# Patient Record
Sex: FEMALE | Race: BLACK OR AFRICAN AMERICAN | Employment: UNEMPLOYED | URBAN - METROPOLITAN AREA
[De-identification: names, ages, dates, MRNs, and addresses within clinical notes are randomized per-mention and may not be internally consistent; named-entity substitution may affect disease eponyms.]

---

## 2017-06-19 ENCOUNTER — OFFICE VISIT (OUTPATIENT)
Dept: INTERNAL MEDICINE CLINIC | Age: 67
End: 2017-06-19

## 2017-06-19 VITALS
WEIGHT: 278 LBS | HEIGHT: 61 IN | DIASTOLIC BLOOD PRESSURE: 126 MMHG | OXYGEN SATURATION: 94 % | TEMPERATURE: 97.8 F | BODY MASS INDEX: 52.49 KG/M2 | HEART RATE: 72 BPM | RESPIRATION RATE: 20 BRPM | SYSTOLIC BLOOD PRESSURE: 188 MMHG

## 2017-06-19 DIAGNOSIS — E55.9 VITAMIN D DEFICIENCY: ICD-10-CM

## 2017-06-19 DIAGNOSIS — Z13.6 SCREENING FOR ISCHEMIC HEART DISEASE: ICD-10-CM

## 2017-06-19 DIAGNOSIS — E11.9 CONTROLLED TYPE 2 DIABETES MELLITUS WITHOUT COMPLICATION, WITHOUT LONG-TERM CURRENT USE OF INSULIN (HCC): Primary | ICD-10-CM

## 2017-06-19 DIAGNOSIS — I10 ESSENTIAL HYPERTENSION: ICD-10-CM

## 2017-06-19 PROBLEM — E78.5 HYPERLIPIDEMIA: Status: ACTIVE | Noted: 2017-06-19

## 2017-06-19 RX ORDER — ERGOCALCIFEROL 1.25 MG/1
50000 CAPSULE ORAL
Qty: 12 CAP | Refills: 2 | Status: SHIPPED | OUTPATIENT
Start: 2017-06-19 | End: 2018-03-06 | Stop reason: SDUPTHER

## 2017-06-19 RX ORDER — METFORMIN HYDROCHLORIDE 500 MG/1
1000 TABLET, EXTENDED RELEASE ORAL DAILY
Qty: 60 TAB | Refills: 5 | Status: SHIPPED | OUTPATIENT
Start: 2017-06-19 | End: 2017-08-21 | Stop reason: SDUPTHER

## 2017-06-19 RX ORDER — HYDROCHLOROTHIAZIDE 50 MG/1
50 TABLET ORAL DAILY
Qty: 30 TAB | Refills: 5 | Status: SHIPPED | OUTPATIENT
Start: 2017-06-19 | End: 2017-08-17 | Stop reason: SDUPTHER

## 2017-06-19 RX ORDER — LOSARTAN POTASSIUM 100 MG/1
100 TABLET ORAL DAILY
Qty: 30 TAB | Refills: 5 | Status: SHIPPED | OUTPATIENT
Start: 2017-06-19 | End: 2017-08-17 | Stop reason: SDUPTHER

## 2017-06-19 RX ORDER — METOPROLOL SUCCINATE 50 MG/1
50 TABLET, EXTENDED RELEASE ORAL DAILY
Qty: 30 TAB | Refills: 5 | Status: SHIPPED | OUTPATIENT
Start: 2017-06-19 | End: 2017-08-17 | Stop reason: SDUPTHER

## 2017-06-19 NOTE — PROGRESS NOTES
Chief Complaint   Patient presents with    Establish Care    Diabetes    Hypertension   1. Have you been to the ER, urgent care clinic since your last visit? Hospitalized since your last visit? No    2. Have you seen or consulted any other health care providers outside of the [address] [place] [address] since your last visit? Include any pap smears or colon screening.  No

## 2017-06-19 NOTE — MR AVS SNAPSHOT
Visit Information Date & Time Provider Department Dept. Phone Encounter #  
 6/19/2017  1:30 PM Erica Oliveira MD [facility] [phone] Follow-up Instructions Return in about 1 month (around 7/19/2017) for HTN, DM f/u. Upcoming Health Maintenance Date Due Hepatitis C Screening 1950 DTaP/Tdap/Td series (1 - Tdap) 2/19/1972 BREAST CANCER SCRN MAMMOGRAM 2/19/2001 FOBT Q 1 YEAR AGE 50-75 2/19/2001 ZOSTER VACCINE AGE 60> 2/19/2011 GLAUCOMA SCREENING Q2Y 2/19/2016 OSTEOPOROSIS SCREENING (DEXA) 2/19/2016 Pneumococcal 65+ Low/Medium Risk (1 of 2 - PCV13) 2/19/2016 MEDICARE YEARLY EXAM 2/19/2016 INFLUENZA AGE 9 TO ADULT 8/1/2017 Allergies as of 6/19/2017  Review Complete On: 6/19/2017 By: Erica Oliveira MD  
  
 Severity Noted Reaction Type Reactions Penicillins Low 06/19/2017   Topical Itching Current Immunizations  Never Reviewed No immunizations on file. Not reviewed this visit You Were Diagnosed With   
  
 Codes Comments Controlled type 2 diabetes mellitus without complication, without long-term current use of insulin ([facility]    -  Primary ICD-10-CM: E11.9 ICD-9-CM: 250.00 Essential hypertension     ICD-10-CM: I10 
ICD-9-CM: 401.9 Hyperlipidemia, unspecified hyperlipidemia type     ICD-10-CM: E78.5 ICD-9-CM: 272.4 Vitamin D deficiency     ICD-10-CM: E55.9 ICD-9-CM: 268.9 Vitals BP Pulse Temp Resp Height(growth percentile) Weight(growth percentile) (!) 188/126 72 97.8 °F (36.6 °C) (Oral) 20 5' 1\" (1.549 m) 278 lb (126.1 kg) SpO2 BMI Smoking Status 94% 52.53 kg/m2 Never Smoker Vitals History BMI and BSA Data Body Mass Index Body Surface Area 52.53 kg/m 2 2.33 m 2 Preferred Pharmacy Pharmacy Name Phone [facility] [address], [address] Your Updated Medication List  
 This list is accurate as of: 6/19/17  2:35 PM.  Always use your most recent med list.  
  
  
  
  
 ergocalciferol 50,000 unit capsule Commonly known as:  ERGOCALCIFEROL Take 1 Cap by mouth every seven (7) days. hydroCHLOROthiazide 50 mg tablet Commonly known as:  HYDRODIURIL Take 1 Tab by mouth daily. losartan 100 mg tablet Commonly known as:  COZAAR Take 1 Tab by mouth daily. metFORMIN  mg tablet Commonly known as:  GLUCOPHAGE XR Take 2 Tabs by mouth daily. metoprolol succinate 50 mg XL tablet Commonly known as:  TOPROL-XL Take 1 Tab by mouth daily. Prescriptions Sent to Pharmacy Refills  
 metFORMIN ER (GLUCOPHAGE XR) 500 mg tablet 5 Sig: Take 2 Tabs by mouth daily. Class: Normal  
 Pharmacy: [facility] [address] [place] [address], [address] [place] [address] Ph #: [phone] Route: Oral  
 hydroCHLOROthiazide (HYDRODIURIL) 50 mg tablet 5 Sig: Take 1 Tab by mouth daily. Class: Normal  
 Pharmacy: [facility] [address] [place] [address], [address] [place] [address] Ph #: [phone] Route: Oral  
 losartan (COZAAR) 100 mg tablet 5 Sig: Take 1 Tab by mouth daily. Class: Normal  
 Pharmacy: [facility] [address] [place] [address], [address] [place] [address] Ph #: [phone] Route: Oral  
 metoprolol succinate (TOPROL-XL) 50 mg XL tablet 5 Sig: Take 1 Tab by mouth daily. Class: Normal  
 Pharmacy: [facility] [address] [place] [address], [address] [place] [address] Ph #: [phone] Route: Oral  
 ergocalciferol (ERGOCALCIFEROL) 50,000 unit capsule 2 Sig: Take 1 Cap by mouth every seven (7) days. Class: Normal  
 Pharmacy: [facility] [address] [place] [address], [address] [place] [address] Ph #: [phone] Route: Oral  
  
Follow-up Instructions Return in about 1 month (around 7/19/2017) for HTN, DM f/u. To-Do List   
 06/19/2017 Lab:  CBC WITH AUTOMATED DIFF   
  
 06/19/2017 Lab:  HEMOGLOBIN A1C WITH EAG   
  
 06/19/2017 Lab:  LIPID PANEL   
  
 06/19/2017 Lab:  METABOLIC PANEL, COMPREHENSIVE   
  
 06/19/2017 Lab:  TSH 3RD GENERATION   
  
 06/19/2017 Lab:  VITAMIN D, 25 HYDROXY Patient Instructions Learning About Diabetes Food Guidelines Your Care Instructions Meal planning is important to manage diabetes. It helps keep your blood sugar at a target level (which you set with your doctor). You don't have to eat special foods. You can eat what your family eats, including sweets once in a while. But you do have to pay attention to how often you eat and how much you eat of certain foods. You may want to work with a dietitian or a certified diabetes educator (CDE) to help you plan meals and snacks. A dietitian or CDE can also help you lose weight if that is one of your goals. What should you know about eating carbs? Managing the amount of carbohydrate (carbs) you eat is an important part of healthy meals when you have diabetes. Carbohydrate is found in many foods. · Learn which foods have carbs. And learn the amounts of carbs in different foods. ¨ Bread, cereal, pasta, and rice have about 15 grams of carbs in a serving. A serving is 1 slice of bread (1 ounce), ½ cup of cooked cereal, or 1/3 cup of cooked pasta or rice. ¨ Fruits have 15 grams of carbs in a serving. A serving is 1 small fresh fruit, such as an apple or orange; ½ of a banana; ½ cup of cooked or canned fruit; ½ cup of fruit juice; 1 cup of melon or raspberries; or 2 tablespoons of dried fruit. ¨ Milk and no-sugar-added yogurt have 15 grams of carbs in a serving. A serving is 1 cup of milk or 2/3 cup of no-sugar-added yogurt. ¨ Starchy vegetables have 15 grams of carbs in a serving. A serving is ½ cup of mashed potatoes or sweet potato; 1 cup winter squash; ½ of a small baked potato; ½ cup of cooked beans; or ½ cup cooked corn or green peas. · Learn how much carbs to eat each day and at each meal. A dietitian or CDE can teach you how to keep track of the amount of carbs you eat. This is called carbohydrate counting. · If you are not sure how to count carbohydrate grams, use the Plate Method to plan meals. It is a good, quick way to make sure that you have a balanced meal. It also helps you spread carbs throughout the day. ¨ Divide your plate by types of foods. Put non-starchy vegetables on half the plate, meat or other protein food on one-quarter of the plate, and a grain or starchy vegetable in the final quarter of the plate. To this you can add a small piece of fruit and 1 cup of milk or yogurt, depending on how many carbs you are supposed to eat at a meal. 
· Try to eat about the same amount of carbs at each meal. Do not \"save up\" your daily allowance of carbs to eat at one meal. 
· Proteins have very little or no carbs per serving. Examples of proteins are beef, chicken, turkey, fish, eggs, tofu, cheese, cottage cheese, and peanut butter. A serving size of meat is 3 ounces, which is about the size of a deck of cards. Examples of meat substitute serving sizes (equal to 1 ounce of meat) are 1/4 cup of cottage cheese, 1 egg, 1 tablespoon of peanut butter, and ½ cup of tofu. How can you eat out and still eat healthy? · Learn to estimate the serving sizes of foods that have carbohydrate. If you measure food at home, it will be easier to estimate the amount in a serving of restaurant food. · If the meal you order has too much carbohydrate (such as potatoes, corn, or baked beans), ask to have a low-carbohydrate food instead. Ask for a salad or green vegetables. · If you use insulin, check your blood sugar before and after eating out to help you plan how much to eat in the future. · If you eat more carbohydrate at a meal than you had planned, take a walk or do other exercise. This will help lower your blood sugar. What else should you know? · Limit saturated fat, such as the fat from meat and dairy products. This is a healthy choice because people who have diabetes are at higher risk of heart disease. So choose lean cuts of meat and nonfat or low-fat dairy products. Use olive or canola oil instead of butter or shortening when cooking. · Don't skip meals. Your blood sugar may drop too low if you skip meals and take insulin or certain medicines for diabetes. · Check with your doctor before you drink alcohol. Alcohol can cause your blood sugar to drop too low. Alcohol can also cause a bad reaction if you take certain diabetes medicines. Follow-up care is a key part of your treatment and safety. Be sure to make and go to all appointments, and call your doctor if you are having problems. It's also a good idea to know your test results and keep a list of the medicines you take. Where can you learn more? Go to http://natty-teja.info/. Enter I184 in the search box to learn more about \"Learning About Diabetes Food Guidelines. \" Current as of: May 23, 2016 Content Version: 11.2 © 2722-1505 [employer]. Care instructions adapted under license by [employer] (which disclaims liability or warranty for this information). If you have questions about a medical condition or this instruction, always ask your healthcare professional. [address] any warranty or liability for your use of this information. Introducing [facility]! Lori Kiser introduces [employer] patient portal. Now you can access parts of your medical record, email your doctor's office, and request medication refills online. 1. In your internet browser, go to [email or web address] 2. Click on the First Time User? Click Here link in the Sign In box. You will see the New Member Sign Up page. 3. Enter your [employer] Access Code exactly as it appears below.  You will not need to use this code after youve completed the sign-up process. If you do not sign up before the expiration date, you must request a new code. · [employer] Access Code: [email or web address] Expires: 9/17/2017  2:35 PM 
 
4. Enter the last four digits of your Social Security Number (xxxx) and Date of Birth (mm/dd/yyyy) as indicated and click Submit. You will be taken to the next sign-up page. 5. Create a [employer] ID. This will be your [employer] login ID and cannot be changed, so think of one that is secure and easy to remember. 6. Create a [employer] password. You can change your password at any time. 7. Enter your Password Reset Question and Answer. This can be used at a later time if you forget your password. 8. Enter your e-mail address. You will receive e-mail notification when new information is available in [address]. 9. Click Sign Up. You can now view and download portions of your medical record. 10. Click the Download Summary menu link to download a portable copy of your medical information. If you have questions, please visit the Frequently Asked Questions section of the [employer] website. Remember, [employer] is NOT to be used for urgent needs. For medical emergencies, dial 911. Now available from your iPhone and Android! Please provide this summary of care documentation to your next provider. If you have any questions after today's visit, please call [phone].

## 2017-06-19 NOTE — PROGRESS NOTES
History:   Ngoc Miller is a 78 y.o. female presenting today for an initial visit for Establish Care; Diabetes; and Hypertension  Her  sees me, so she's transferring to me as well. Was seeing a [facility] doctor up until then. 1.  HTN:  He BP has been variable. She's on HCTZ, metoprolol, and losartan and says she took them today. As far as she knows, her BP was fine at previous doctor's appointments. But she says the current meds are very old and so thinks they're not good anymore. She denies symptoms. The patient denies chest pain, shortness of breath, headaches, dizziness, weakness. She does get swelling in the legs. 2.  DM:  She's on metformin every day. Doesn't check sugars at home. No symptoms. She had breakfast today but no lunch yet. 3.  Vit D deficiency:  Has been on Vit D weekly in the past but not now. 4.  Anxiety:  She's been under a lot of stress with her 's illness. Not currently on meds for anxiety. Has been on meds once in the past.    Review of Systems   Constitutional: Negative. HENT: Negative. Eyes: Negative. Respiratory: Negative. Cardiovascular: Positive for leg swelling. Negative for chest pain. Gastrointestinal: Negative. Genitourinary: Negative. Skin: Negative. Neurological: Negative. Psychiatric/Behavioral: The patient is not nervous/anxious (stress currently). Past Medical History:   Diagnosis Date    Anxiety     Arthritis     Diabetes ([address]     Hypercholesterolemia     Hypertension        Past Surgical History:   Procedure Laterality Date    HX GYN         Social History     Social History    Marital status:      Spouse name: N/A    Number of children: N/A    Years of education: N/A     Occupational History    Not on file.      Social History Main Topics    Smoking status: Never Smoker    Smokeless tobacco: Not on file    Alcohol use No    Drug use: No    Sexual activity: Not Currently Partners: Male     Other Topics Concern    Not on file     Social History Narrative    No narrative on file       Family History   Problem Relation Age of Onset    Heart Disease Mother     Liver Disease Father     Heart Disease Brother        No current outpatient prescriptions on file prior to visit. No current facility-administered medications on file prior to visit. Allergies   Allergen Reactions    Penicillins Itching       Objective:   VS:    Visit Vitals    BP (!) 188/126    Pulse 72    Temp 97.8 °F (36.6 °C) (Oral)    Resp 20    Ht 5' 1\" (1.549 m)    Wt 278 lb (126.1 kg)    SpO2 94%    BMI 52.53 kg/m2     Physical Exam   Constitutional: She is oriented to person, place, and time. She appears well-developed and well-nourished. No distress. HENT:   Head: Normocephalic and atraumatic. Right Ear: External ear normal.   Left Ear: External ear normal.   Nose: Nose normal.   Mouth/Throat: Oropharynx is clear and moist.   Eyes: Conjunctivae and EOM are normal. Pupils are equal, round, and reactive to light. No scleral icterus. Neck: Normal range of motion. Neck supple. No tracheal deviation present. No thyromegaly present. Cardiovascular: Normal rate, regular rhythm, normal heart sounds and intact distal pulses. Exam reveals no gallop and no friction rub. No murmur heard. Pulmonary/Chest: Effort normal and breath sounds normal. She has no wheezes. She has no rales. Abdominal: Soft. Bowel sounds are normal. She exhibits no distension. There is no hepatosplenomegaly. There is no tenderness. Musculoskeletal: Normal range of motion. She exhibits no edema or tenderness. Lymphadenopathy:     She has no cervical adenopathy. Neurological: She is alert and oriented to person, place, and time. Skin: Skin is warm and dry. No rash noted. No pallor. Psychiatric: She has a normal mood and affect.  Her behavior is normal. Judgment and thought content normal.   Nursing note and vitals reviewed. Assessment/ Plan:     Ramo Echavarria was seen today for establish care, diabetes and hypertension. Diagnoses and all orders for this visit:    Controlled type 2 diabetes mellitus without complication, without long-term current use of insulin ([facility]):  She's been on 1 metformin regular 500 mg daily. Wants to be on daily meds, not BID. So switching to metformin XR and increasing to 1000 daily because I think she will probably need higher dose. -     METABOLIC PANEL, COMPREHENSIVE; Future  -     HEMOGLOBIN A1C WITH EAG; Future  -     metFORMIN ER (GLUCOPHAGE XR) 500 mg tablet; Take 2 Tabs by mouth daily.  -     UT COLLECTION VENOUS BLOOD,VENIPUNCTURE    Essential hypertension:  BP very high, but she thinks her meds are old and so maybe . So will reorder her 3 meds and see if BP improves. She will check BP in the next few days, and I'll see her back in 1 month to make sure ok. -     CBC WITH AUTOMATED DIFF; Future  -     TSH 3RD GENERATION; Future  -     hydroCHLOROthiazide (HYDRODIURIL) 50 mg tablet; Take 1 Tab by mouth daily. -     losartan (COZAAR) 100 mg tablet; Take 1 Tab by mouth daily. -     metoprolol succinate (TOPROL-XL) 50 mg XL tablet; Take 1 Tab by mouth daily.  -     UT COLLECTION VENOUS BLOOD,VENIPUNCTURE    Screening for ischemic heart disease:  No hx of hyperlipidemia, but with comorbidities will check lipids.  -     LIPID PANEL; Future    Vitamin D deficiency  -     VITAMIN D, 25 HYDROXY; Future  -     ergocalciferol (ERGOCALCIFEROL) 50,000 unit capsule; Take 1 Cap by mouth every seven (7) days. I have discussed the diagnosis with the patient and the intended plan as seen in the above orders. The patient verbalized understanding and agrees with the plan. Follow-up Disposition:  Return in about 1 month (around 2017) for HTN, DM f/u.     Sally Pisano MD

## 2017-06-20 LAB
25(OH)D3+25(OH)D2 SERPL-MCNC: 16.6 NG/ML (ref 30–100)
ALBUMIN SERPL-MCNC: 3.5 G/DL (ref 3.6–4.8)
ALBUMIN/GLOB SERPL: 0.9 {RATIO} (ref 1.2–2.2)
ALP SERPL-CCNC: 67 IU/L (ref 39–117)
ALT SERPL-CCNC: 6 IU/L (ref 0–32)
AST SERPL-CCNC: 9 IU/L (ref 0–40)
BASOPHILS # BLD AUTO: 0.1 X10E3/UL (ref 0–0.2)
BASOPHILS NFR BLD AUTO: 1 %
BILIRUB SERPL-MCNC: 0.2 MG/DL (ref 0–1.2)
BUN SERPL-MCNC: 13 MG/DL (ref 8–27)
BUN/CREAT SERPL: 16 (ref 12–28)
CALCIUM SERPL-MCNC: 9.2 MG/DL (ref 8.7–10.3)
CHLORIDE SERPL-SCNC: 97 MMOL/L (ref 96–106)
CHOLEST SERPL-MCNC: 194 MG/DL (ref 100–199)
CO2 SERPL-SCNC: 28 MMOL/L (ref 18–29)
CREAT SERPL-MCNC: 0.81 MG/DL (ref 0.57–1)
EOSINOPHIL # BLD AUTO: 0.4 X10E3/UL (ref 0–0.4)
EOSINOPHIL NFR BLD AUTO: 6 %
ERYTHROCYTE [DISTWIDTH] IN BLOOD BY AUTOMATED COUNT: 15.5 % (ref 12.3–15.4)
EST. AVERAGE GLUCOSE BLD GHB EST-MCNC: 146 MG/DL
GLOBULIN SER CALC-MCNC: 3.7 G/DL (ref 1.5–4.5)
GLUCOSE SERPL-MCNC: 119 MG/DL (ref 65–99)
HBA1C MFR BLD: 6.7 % (ref 4.8–5.6)
HCT VFR BLD AUTO: 37.9 % (ref 34–46.6)
HDLC SERPL-MCNC: 44 MG/DL
HGB BLD-MCNC: 11.8 G/DL (ref 11.1–15.9)
IMM GRANULOCYTES # BLD: 0 X10E3/UL (ref 0–0.1)
IMM GRANULOCYTES NFR BLD: 0 %
LDLC SERPL CALC-MCNC: 104 MG/DL (ref 0–99)
LYMPHOCYTES # BLD AUTO: 2.6 X10E3/UL (ref 0.7–3.1)
LYMPHOCYTES NFR BLD AUTO: 34 %
MCH RBC QN AUTO: 24.7 PG (ref 26.6–33)
MCHC RBC AUTO-ENTMCNC: 31.1 G/DL (ref 31.5–35.7)
MCV RBC AUTO: 80 FL (ref 79–97)
MONOCYTES # BLD AUTO: 0.3 X10E3/UL (ref 0.1–0.9)
MONOCYTES NFR BLD AUTO: 4 %
NEUTROPHILS # BLD AUTO: 4.3 X10E3/UL (ref 1.4–7)
NEUTROPHILS NFR BLD AUTO: 55 %
PLATELET # BLD AUTO: 316 X10E3/UL (ref 150–379)
POTASSIUM SERPL-SCNC: 3.7 MMOL/L (ref 3.5–5.2)
PROT SERPL-MCNC: 7.2 G/DL (ref 6–8.5)
RBC # BLD AUTO: 4.77 X10E6/UL (ref 3.77–5.28)
SODIUM SERPL-SCNC: 142 MMOL/L (ref 134–144)
TRIGL SERPL-MCNC: 228 MG/DL (ref 0–149)
TSH SERPL DL<=0.005 MIU/L-ACNC: 1.87 UIU/ML (ref 0.45–4.5)
VLDLC SERPL CALC-MCNC: 46 MG/DL (ref 5–40)
WBC # BLD AUTO: 7.7 X10E3/UL (ref 3.4–10.8)

## 2017-06-22 NOTE — PROGRESS NOTES
Please let her know that  Her A1c looked good:  6.7, so the current dose of metformin is appropriate. Vitamin D was very low (16) so I recommend getting back on the weekly Vit D. Also cholesterol was high (). Because she's a diabetic it's recommended she be on a cholesterol med (statin). See if she's been on one and if she's willing to get on one.

## 2017-06-23 ENCOUNTER — TELEPHONE (OUTPATIENT)
Dept: INTERNAL MEDICINE CLINIC | Age: 67
End: 2017-06-23

## 2017-06-23 DIAGNOSIS — E78.2 MIXED HYPERLIPIDEMIA: Primary | ICD-10-CM

## 2017-06-23 RX ORDER — PRAVASTATIN SODIUM 20 MG/1
20 TABLET ORAL
Qty: 30 TAB | Refills: 5 | Status: SHIPPED | OUTPATIENT
Start: 2017-06-23 | End: 2018-03-06 | Stop reason: SDUPTHER

## 2017-06-23 NOTE — TELEPHONE ENCOUNTER
----- Message from Amirah Mason MD sent at 6/22/2017 10:41 AM EDT -----  Please let her know that  Her A1c looked good:  6.7, so the current dose of metformin is appropriate. Vitamin D was very low (16) so I recommend getting back on the weekly Vit D. Also cholesterol was high (). Because she's a diabetic it's recommended she be on a cholesterol med (statin). See if she's been on one and if she's willing to get on one.

## 2017-08-17 DIAGNOSIS — I10 ESSENTIAL HYPERTENSION: ICD-10-CM

## 2017-08-17 NOTE — TELEPHONE ENCOUNTER
[facility] at [facility] has sent 3 refill requests to see if we will prescribe 90 day supplies of these 3 meds:  Cozaar, Hydroiuril & Toprol XL. Refills were sent to [facility] on 6/19 for 30 days each with 5 refills.     Last OV 6/19/17

## 2017-08-18 RX ORDER — LOSARTAN POTASSIUM 100 MG/1
100 TABLET ORAL DAILY
Qty: 90 TAB | Refills: 1 | Status: SHIPPED | OUTPATIENT
Start: 2017-08-18 | End: 2018-03-06 | Stop reason: SDUPTHER

## 2017-08-18 RX ORDER — METOPROLOL SUCCINATE 50 MG/1
50 TABLET, EXTENDED RELEASE ORAL DAILY
Qty: 90 TAB | Refills: 1 | Status: SHIPPED | OUTPATIENT
Start: 2017-08-18 | End: 2018-03-06 | Stop reason: SDUPTHER

## 2017-08-18 RX ORDER — HYDROCHLOROTHIAZIDE 50 MG/1
50 TABLET ORAL DAILY
Qty: 90 TAB | Refills: 1 | Status: SHIPPED | OUTPATIENT
Start: 2017-08-18 | End: 2018-03-06 | Stop reason: SDUPTHER

## 2017-08-21 DIAGNOSIS — E11.9 CONTROLLED TYPE 2 DIABETES MELLITUS WITHOUT COMPLICATION, WITHOUT LONG-TERM CURRENT USE OF INSULIN (HCC): ICD-10-CM

## 2017-08-21 RX ORDER — METFORMIN HYDROCHLORIDE 500 MG/1
1000 TABLET, EXTENDED RELEASE ORAL DAILY
Qty: 180 TAB | Refills: 1 | Status: SHIPPED | OUTPATIENT
Start: 2017-08-21 | End: 2018-03-06 | Stop reason: SDUPTHER

## 2017-08-21 NOTE — TELEPHONE ENCOUNTER
[facility] faxed request for Glucophage XR 500mg. They are requesting 90 day supply. Last OV 6/19/17.

## 2017-12-11 ENCOUNTER — OFFICE VISIT (OUTPATIENT)
Dept: INTERNAL MEDICINE CLINIC | Age: 67
End: 2017-12-11

## 2017-12-11 VITALS
WEIGHT: 267 LBS | OXYGEN SATURATION: 96 % | SYSTOLIC BLOOD PRESSURE: 165 MMHG | HEIGHT: 61 IN | DIASTOLIC BLOOD PRESSURE: 106 MMHG | TEMPERATURE: 97.7 F | BODY MASS INDEX: 50.41 KG/M2 | RESPIRATION RATE: 18 BRPM | HEART RATE: 89 BPM

## 2017-12-11 DIAGNOSIS — J30.9 CHRONIC ALLERGIC RHINITIS, UNSPECIFIED SEASONALITY, UNSPECIFIED TRIGGER: ICD-10-CM

## 2017-12-11 DIAGNOSIS — J40 BRONCHITIS: Primary | ICD-10-CM

## 2017-12-11 PROBLEM — E66.01 OBESITY, MORBID (HCC): Status: ACTIVE | Noted: 2017-12-11

## 2017-12-11 RX ORDER — BENZONATATE 200 MG/1
200 CAPSULE ORAL
Qty: 30 CAP | Refills: 0 | Status: SHIPPED | OUTPATIENT
Start: 2017-12-11 | End: 2020-08-21

## 2017-12-11 RX ORDER — FLUTICASONE PROPIONATE 50 MCG
2 SPRAY, SUSPENSION (ML) NASAL DAILY
Qty: 1 BOTTLE | Refills: 3 | Status: SHIPPED | OUTPATIENT
Start: 2017-12-11 | End: 2020-08-21 | Stop reason: SDUPTHER

## 2017-12-11 NOTE — MR AVS SNAPSHOT
[address] 
 
 
 [address] [facility] [address] 
[phone] Patient: Chaz Thorpe MRN: [id number] LXP:[id number] Visit Information Date & Time Provider Department Dept. Phone Encounter #  
 12/11/2017 11:15 AM Radha Calderón MD [facility] [phone] [id number] Follow-up Instructions Return in about 1 month (around 1/11/2018) for combo clinic. Your Appointments 12/11/2017 11:15 AM  
Office Visit with Radha Calderón MD  
[facility] Appt Note: OV for Cough [address] [facility] [address]  
[phone]  
  
   
 [address] [address] [facility] [address] Upcoming Health Maintenance Date Due Hepatitis C Screening 1950 FOOT EXAM Q1 2/19/1961 MICROALBUMIN Q1 2/19/1961 EYE EXAM RETINAL OR DILATED Q1 2/19/1961 DTaP/Tdap/Td series (1 - Tdap) 2/19/1972 BREAST CANCER SCRN MAMMOGRAM 2/19/2001 FOBT Q 1 YEAR AGE 50-75 2/19/2001 ZOSTER VACCINE AGE 60> 12/19/2010 GLAUCOMA SCREENING Q2Y 2/19/2016 OSTEOPOROSIS SCREENING (DEXA) 2/19/2016 Pneumococcal 65+ Low/Medium Risk (1 of 2 - PCV13) 2/19/2016 MEDICARE YEARLY EXAM 2/19/2016 Influenza Age 5 to Adult 8/1/2017 HEMOGLOBIN A1C Q6M 12/19/2017 LIPID PANEL Q1 6/19/2018 Allergies as of 12/11/2017  Review Complete On: 12/11/2017 By: Radha Calderón MD  
  
 Severity Noted Reaction Type Reactions Penicillins Low 06/19/2017   Topical Itching Current Immunizations  Never Reviewed No immunizations on file. Not reviewed this visit You Were Diagnosed With   
  
 Codes Comments Bronchitis    -  Primary ICD-10-CM: O78 ICD-9-CM: 976 Chronic allergic rhinitis, unspecified seasonality, unspecified trigger     ICD-10-CM: J30.9 ICD-9-CM: 477.9 Vitals BP Pulse Temp Resp Height(growth percentile) Weight(growth percentile) (!) 165/106 89 97.7 °F (36.5 °C) 18 5' 1\" (1.549 m) 267 lb (121.1 kg) SpO2 BMI OB Status Smoking Status 96% 50.45 kg/m2 Hysterectomy Never Smoker Vitals History BMI and BSA Data Body Mass Index Body Surface Area 50.45 kg/m 2 2.28 m 2 Preferred Pharmacy Pharmacy Name Phone [facility] [address], [address] Your Updated Medication List  
  
   
This list is accurate as of: 17 10:46 AM.  Always use your most recent med list.  
  
  
  
  
 benzonatate 200 mg capsule Commonly known as:  TESSALON Take 1 Cap by mouth three (3) times daily as needed for Cough.  
  
 ergocalciferol 50,000 unit capsule Commonly known as:  ERGOCALCIFEROL Take 1 Cap by mouth every seven (7) days. fluticasone 50 mcg/actuation nasal spray Commonly known as:  Marsh Fails 2 Sprays by Both Nostrils route daily. Indications: Allergic Rhinitis  
  
 hydroCHLOROthiazide 50 mg tablet Commonly known as:  HYDRODIURIL Take 1 Tab by mouth daily. losartan 100 mg tablet Commonly known as:  COZAAR Take 1 Tab by mouth daily. metFORMIN  mg tablet Commonly known as:  GLUCOPHAGE XR Take 2 Tabs by mouth daily. metoprolol succinate 50 mg XL tablet Commonly known as:  TOPROL-XL Take 1 Tab by mouth daily. pravastatin 20 mg tablet Commonly known as:  PRAVACHOL Take 1 Tab by mouth nightly. Prescriptions Sent to Pharmacy Refills  
 fluticasone (FLONASE) 50 mcg/actuation nasal spray 3 Si Sprays by Both Nostrils route daily. Indications: Allergic Rhinitis Class: Normal  
 Pharmacy: [facility] [address], [address] Ph #: [phone] Route: Both Nostrils  
 benzonatate (TESSALON) 200 mg capsule 0 Sig: Take 1 Cap by mouth three (3) times daily as needed for Cough. Class: Normal  
 Pharmacy: [address], [address] #: [phone] Route: Oral  
  
Follow-up Instructions Return in about 1 month (around 1/11/2018) for combo clinic. Patient Instructions Saline Nasal Washes: Care Instructions Your Care Instructions Saline nasal washes help keep the nasal passages open by washing out thick or dried mucus. This simple remedy can help relieve symptoms of allergies, sinusitis, and colds. It also can make the nose feel more comfortable by keeping the mucous membranes moist. You may notice a little burning sensation in your nose the first few times you use the solution, but this usually gets better in a few days. Follow-up care is a key part of your treatment and safety. Be sure to make and go to all appointments, and call your doctor if you are having problems. It's also a good idea to know your test results and keep a list of the medicines you take. How can you care for yourself at home? · You can buy premixed saline solution in a squeeze bottle or other sinus rinse products at a drugstore. Read and follow the instructions on the label. · You also can make your own saline solution by adding 1 teaspoon of salt and 1 teaspoon of baking soda to 2 cups of distilled water. · If you use a homemade solution, pour a small amount into a clean bowl. Using a rubber bulb syringe, squeeze the syringe and place the tip in the salt water. Pull a small amount of the salt water into the syringe by relaxing your hand. · Sit down with your head tilted slightly back. Do not lie down. Put the tip of the bulb syringe or the squeeze bottle a little way into one of your nostrils. Gently drip or squirt a few drops into the nostril. Repeat with the other nostril. Some sneezing and gagging are normal at first. 
· Gently blow your nose. · Wipe the syringe or bottle tip clean after each use. · Repeat this 2 or 3 times a day. · Use nasal washes gently if you have nosebleeds often. When should you call for help? Watch closely for changes in your health, and be sure to contact your doctor if: 
? · You often get nosebleeds. ? · You have problems doing the nasal washes. Where can you learn more? Go to http://natty-teja.info/. Enter 071 [id number] [id number] in the search box to learn more about \"Saline Nasal Washes: Care Instructions. \" Current as of: May 12, 2017 Content Version: 11.4 © 3395-0029 [employer]. Care instructions adapted under license by [employer] (which disclaims liability or warranty for this information). If you have questions about a medical condition or this instruction, always ask your healthcare professional. [address] any warranty or liability for your use of this information. Introducing [facility]! [facility] introduces [email or web address] patient portal. Now you can access parts of your medical record, email your doctor's office, and request medication refills online. 1. In your internet browser, go to [email or web address] 2. Click on the First Time User? Click Here link in the Sign In box. You will see the New Member Sign Up page. 3. Enter your [email or web address] Access Code exactly as it appears below. You will not need to use this code after youve completed the sign-up process. If you do not sign up before the expiration date, you must request a new code. · [email or web address] Access Code: [email or web address] Expires: 3/11/2018 10:46 AM 
 
4. Enter the last four digits of your Social Security Number (xxxx) and Date of Birth (mm/dd/yyyy) as indicated and click Submit. You will be taken to the next sign-up page. 5. Create a CORP80 ID. This will be your CORP80 login ID and cannot be changed, so think of one that is secure and easy to remember. 6. Create a PayEaset password. You can change your password at any time. 7. Enter your Password Reset Question and Answer. This can be used at a later time if you forget your password. 8. Enter your e-mail address. You will receive e-mail notification when new information is available in [address] 9. Click Sign Up. You can now view and download portions of your medical record. 10. Click the Download Summary menu link to download a portable copy of your medical information. If you have questions, please visit the Frequently Asked Questions section of the EduSourced website. Remember, EduSourced is NOT to be used for urgent needs. For medical emergencies, dial 911. Now available from your iPhone and Android! Please provide this summary of care documentation to your next provider. Your primary care clinician is listed as Eusebio Moyer. If you have any questions after today's visit, please call [phone].

## 2017-12-11 NOTE — PATIENT INSTRUCTIONS
Saline Nasal Washes: Care Instructions  Your Care Instructions  Saline nasal washes help keep the nasal passages open by washing out thick or dried mucus. This simple remedy can help relieve symptoms of allergies, sinusitis, and colds. It also can make the nose feel more comfortable by keeping the mucous membranes moist. You may notice a little burning sensation in your nose the first few times you use the solution, but this usually gets better in a few days. Follow-up care is a key part of your treatment and safety. Be sure to make and go to all appointments, and call your doctor if you are having problems. It's also a good idea to know your test results and keep a list of the medicines you take. How can you care for yourself at home? · You can buy premixed saline solution in a squeeze bottle or other sinus rinse products at a drugstore. Read and follow the instructions on the label. · You also can make your own saline solution by adding 1 teaspoon of salt and 1 teaspoon of baking soda to 2 cups of distilled water. · If you use a homemade solution, pour a small amount into a clean bowl. Using a rubber bulb syringe, squeeze the syringe and place the tip in the salt water. Pull a small amount of the salt water into the syringe by relaxing your hand. · Sit down with your head tilted slightly back. Do not lie down. Put the tip of the bulb syringe or the squeeze bottle a little way into one of your nostrils. Gently drip or squirt a few drops into the nostril. Repeat with the other nostril. Some sneezing and gagging are normal at first.  · Gently blow your nose. · Wipe the syringe or bottle tip clean after each use. · Repeat this 2 or 3 times a day. · Use nasal washes gently if you have nosebleeds often. When should you call for help? Watch closely for changes in your health, and be sure to contact your doctor if:  ? · You often get nosebleeds. ? · You have problems doing the nasal washes.    Where can you learn more? Go to http://natty-teja.info/. Enter [id number] in the search box to learn more about \"Saline Nasal Washes: Care Instructions. \"  Current as of: May 12, 2017  Content Version: 11.4  © 8759-6065 Healthwise, MakeMyTrip.com. Care instructions adapted under license by [employer] (which disclaims liability or warranty for this information). If you have questions about a medical condition or this instruction, always ask your healthcare professional. [address] any warranty or liability for your use of this information.

## 2017-12-11 NOTE — PROGRESS NOTES
Chief Complaint   Patient presents with    Cough     with nasal congestion     1. Have you been to the ER, urgent care clinic since your last visit? Hospitalized since your last visit? No    2. Have you seen or consulted any other health care providers outside of the [address] [place] [address] since your last visit? Include any pap smears or colon screening.  No

## 2017-12-11 NOTE — PROGRESS NOTES
Subjective:   Patient is a 78y.o. year old female who presents for Cough (with nasal congestion)  1. Cough:  Has had symptoms for 1 month. Worse at night. No fever. Has some sore throat like mucus is draining. Doesn't remember it starting with a cold. Having some sinus pain/pressure. She's taking OTC meds for this:  Generic Zyrtec. She's been on nasal saline in the past.      2.  HTN:  BP very high today but she didn't take her meds. I recommended taking right away and come back in 1 month for combo clinic. Review of Systems   Constitutional: Negative. Respiratory: Positive for cough. Negative for sputum production, shortness of breath and wheezing. Current Outpatient Prescriptions on File Prior to Visit   Medication Sig Dispense Refill    metFORMIN ER (GLUCOPHAGE XR) 500 mg tablet Take 2 Tabs by mouth daily. 180 Tab 1    losartan (COZAAR) 100 mg tablet Take 1 Tab by mouth daily. 90 Tab 1    metoprolol succinate (TOPROL-XL) 50 mg XL tablet Take 1 Tab by mouth daily. 90 Tab 1    hydroCHLOROthiazide (HYDRODIURIL) 50 mg tablet Take 1 Tab by mouth daily. 90 Tab 1    pravastatin (PRAVACHOL) 20 mg tablet Take 1 Tab by mouth nightly. 30 Tab 5    ergocalciferol (ERGOCALCIFEROL) 50,000 unit capsule Take 1 Cap by mouth every seven (7) days. 12 Cap 2     No current facility-administered medications on file prior to visit. Reviewed PmHx, RxHx, FmHx, SocHx, AllgHx and updated and dated in the chart. Nurse notes were reviewed and are correct    Objective:     Vitals:    12/11/17 1010 12/11/17 1013   BP: (!) 180/111 (!) 165/106   Pulse: 89    Resp: 18    Temp: 97.7 °F (36.5 °C)    SpO2: 96%    Weight: 267 lb (121.1 kg)    Height: 5' 1\" (1.549 m)      Physical Exam   Constitutional: She appears well-developed and well-nourished. No distress. HENT:   Head: Normocephalic and atraumatic.    Right Ear: External ear normal.   Left Ear: External ear normal.   Nose: Nose normal.   Mouth/Throat: Oropharynx is clear and moist. No oropharyngeal exudate. Eyes: Conjunctivae and EOM are normal. Pupils are equal, round, and reactive to light. Neck: Normal range of motion. Neck supple. Cardiovascular: Normal rate, regular rhythm and normal heart sounds. Exam reveals no gallop and no friction rub. No murmur heard. Pulmonary/Chest: Effort normal and breath sounds normal. No respiratory distress. She has no wheezes. Lymphadenopathy:     She has no cervical adenopathy. Nursing note and vitals reviewed. Assessment/ Plan:     Diagnoses and all orders for this visit:    1. Bronchitis  -     benzonatate (TESSALON) 200 mg capsule; Take 1 Cap by mouth three (3) times daily as needed for Cough. 2. Chronic allergic rhinitis, unspecified seasonality, unspecified trigger:  Not enough to treat for abx, but if develops worsening sinus pain, will add abx.  -     fluticasone (FLONASE) 50 mcg/actuation nasal spray; 2 Sprays by Both Nostrils route daily. Indications: Allergic Rhinitis     I have discussed the diagnosis with the patient and the intended plan as seen in the above orders. The patient verbalized understanding and agrees with the plan. Follow-up Disposition:  Return in about 1 month (around 1/11/2018) for combo clinic.     Becky Plascencia MD

## 2018-01-02 NOTE — PATIENT INSTRUCTIONS
History of present illness: 56-year-old female with a history of multiple sclerosis and depression.  I follow her for osteoarthritis and fibromyalgia.  She complains of increased aching in her knees, worse on the left than on the right.  She had been more active.  She has been using Voltaren gel which has been helping.  She remains on meloxicam, Cymbalta, and Zanaflex, and gabapentin.  She is taking tramadol 4-6 daily.  She denies other recent medical problems.    Physical examination:  Musculoskeletal: She has pain on range of motion of the left shoulder.  Right shoulder is unremarkable.  Elbows, wrists, small joints the hands are unremarkable.  She has bony hypertrophy of the left knee with some tenderness to palpation.  She has no effusion.  Right knee is unremarkable.  Ankles and feet are within normal limits.    Assessment: Osteoarthritis and fibromyalgia    Plans: Continue medication as before.  Return to see me in 6 months.   Learning About Diabetes Food Guidelines  Your Care Instructions  Meal planning is important to manage diabetes. It helps keep your blood sugar at a target level (which you set with your doctor). You don't have to eat special foods. You can eat what your family eats, including sweets once in a while. But you do have to pay attention to how often you eat and how much you eat of certain foods. You may want to work with a dietitian or a certified diabetes educator (CDE) to help you plan meals and snacks. A dietitian or CDE can also help you lose weight if that is one of your goals. What should you know about eating carbs? Managing the amount of carbohydrate (carbs) you eat is an important part of healthy meals when you have diabetes. Carbohydrate is found in many foods. · Learn which foods have carbs. And learn the amounts of carbs in different foods. ¨ Bread, cereal, pasta, and rice have about 15 grams of carbs in a serving. A serving is 1 slice of bread (1 ounce), ½ cup of cooked cereal, or 1/3 cup of cooked pasta or rice. ¨ Fruits have 15 grams of carbs in a serving. A serving is 1 small fresh fruit, such as an apple or orange; ½ of a banana; ½ cup of cooked or canned fruit; ½ cup of fruit juice; 1 cup of melon or raspberries; or 2 tablespoons of dried fruit. ¨ Milk and no-sugar-added yogurt have 15 grams of carbs in a serving. A serving is 1 cup of milk or 2/3 cup of no-sugar-added yogurt. ¨ Starchy vegetables have 15 grams of carbs in a serving. A serving is ½ cup of mashed potatoes or sweet potato; 1 cup winter squash; ½ of a small baked potato; ½ cup of cooked beans; or ½ cup cooked corn or green peas. · Learn how much carbs to eat each day and at each meal. A dietitian or CDE can teach you how to keep track of the amount of carbs you eat. This is called carbohydrate counting. · If you are not sure how to count carbohydrate grams, use the Plate Method to plan meals.  It is a good, quick way to make sure that you have a balanced meal. It also helps you spread carbs throughout the day. ¨ Divide your plate by types of foods. Put non-starchy vegetables on half the plate, meat or other protein food on one-quarter of the plate, and a grain or starchy vegetable in the final quarter of the plate. To this you can add a small piece of fruit and 1 cup of milk or yogurt, depending on how many carbs you are supposed to eat at a meal.  · Try to eat about the same amount of carbs at each meal. Do not \"save up\" your daily allowance of carbs to eat at one meal.  · Proteins have very little or no carbs per serving. Examples of proteins are beef, chicken, turkey, fish, eggs, tofu, cheese, cottage cheese, and peanut butter. A serving size of meat is 3 ounces, which is about the size of a deck of cards. Examples of meat substitute serving sizes (equal to 1 ounce of meat) are 1/4 cup of cottage cheese, 1 egg, 1 tablespoon of peanut butter, and ½ cup of tofu. How can you eat out and still eat healthy? · Learn to estimate the serving sizes of foods that have carbohydrate. If you measure food at home, it will be easier to estimate the amount in a serving of restaurant food. · If the meal you order has too much carbohydrate (such as potatoes, corn, or baked beans), ask to have a low-carbohydrate food instead. Ask for a salad or green vegetables. · If you use insulin, check your blood sugar before and after eating out to help you plan how much to eat in the future. · If you eat more carbohydrate at a meal than you had planned, take a walk or do other exercise. This will help lower your blood sugar. What else should you know? · Limit saturated fat, such as the fat from meat and dairy products. This is a healthy choice because people who have diabetes are at higher risk of heart disease. So choose lean cuts of meat and nonfat or low-fat dairy products. Use olive or canola oil instead of butter or shortening when cooking.   · Don't skip meals. Your blood sugar may drop too low if you skip meals and take insulin or certain medicines for diabetes. · Check with your doctor before you drink alcohol. Alcohol can cause your blood sugar to drop too low. Alcohol can also cause a bad reaction if you take certain diabetes medicines. Follow-up care is a key part of your treatment and safety. Be sure to make and go to all appointments, and call your doctor if you are having problems. It's also a good idea to know your test results and keep a list of the medicines you take. Where can you learn more? Go to http://natty-teja.info/. Enter S354 in the search box to learn more about \"Learning About Diabetes Food Guidelines. \"  Current as of: May 23, 2016  Content Version: 11.2  © 9927-9446 [employer]. Care instructions adapted under license by [employer] (which disclaims liability or warranty for this information). If you have questions about a medical condition or this instruction, always ask your healthcare professional. [address] any warranty or liability for your use of this information.

## 2018-03-06 ENCOUNTER — OFFICE VISIT (OUTPATIENT)
Dept: INTERNAL MEDICINE CLINIC | Age: 68
End: 2018-03-06

## 2018-03-06 VITALS
TEMPERATURE: 98.1 F | DIASTOLIC BLOOD PRESSURE: 105 MMHG | WEIGHT: 264 LBS | OXYGEN SATURATION: 96 % | BODY MASS INDEX: 49.84 KG/M2 | HEIGHT: 61 IN | RESPIRATION RATE: 18 BRPM | SYSTOLIC BLOOD PRESSURE: 177 MMHG | HEART RATE: 88 BPM

## 2018-03-06 DIAGNOSIS — F41.9 ANXIETY: ICD-10-CM

## 2018-03-06 DIAGNOSIS — E55.9 VITAMIN D DEFICIENCY: ICD-10-CM

## 2018-03-06 DIAGNOSIS — Z00.00 MEDICARE ANNUAL WELLNESS VISIT, SUBSEQUENT: ICD-10-CM

## 2018-03-06 DIAGNOSIS — E11.9 CONTROLLED TYPE 2 DIABETES MELLITUS WITHOUT COMPLICATION, WITHOUT LONG-TERM CURRENT USE OF INSULIN (HCC): Primary | ICD-10-CM

## 2018-03-06 DIAGNOSIS — Z12.39 SCREENING BREAST EXAMINATION: ICD-10-CM

## 2018-03-06 DIAGNOSIS — I10 ESSENTIAL HYPERTENSION: ICD-10-CM

## 2018-03-06 DIAGNOSIS — Z12.11 COLON CANCER SCREENING: ICD-10-CM

## 2018-03-06 DIAGNOSIS — E78.2 MIXED HYPERLIPIDEMIA: ICD-10-CM

## 2018-03-06 PROBLEM — E11.21 TYPE 2 DIABETES WITH NEPHROPATHY (HCC): Status: ACTIVE | Noted: 2018-03-06

## 2018-03-06 PROBLEM — E78.5 HYPERLIPIDEMIA: Status: RESOLVED | Noted: 2017-06-19 | Resolved: 2018-03-06

## 2018-03-06 LAB
CREAT SERPL-MCNC: 200 MG/DL
MICROALBUMIN UR TEST STR-MCNC: 150 MG/L
MICROALBUMIN/CREAT RATIO POC: NORMAL MG/G

## 2018-03-06 RX ORDER — HYDROCHLOROTHIAZIDE 50 MG/1
50 TABLET ORAL DAILY
Qty: 90 TAB | Refills: 1 | Status: SHIPPED | OUTPATIENT
Start: 2018-03-06 | End: 2018-09-06 | Stop reason: SDUPTHER

## 2018-03-06 RX ORDER — LOSARTAN POTASSIUM 100 MG/1
100 TABLET ORAL DAILY
Qty: 90 TAB | Refills: 1 | Status: SHIPPED | OUTPATIENT
Start: 2018-03-06 | End: 2018-09-06 | Stop reason: SDUPTHER

## 2018-03-06 RX ORDER — METFORMIN HYDROCHLORIDE 500 MG/1
1000 TABLET, EXTENDED RELEASE ORAL DAILY
Qty: 180 TAB | Refills: 1 | Status: SHIPPED | OUTPATIENT
Start: 2018-03-06 | End: 2018-09-06 | Stop reason: SDUPTHER

## 2018-03-06 RX ORDER — METOPROLOL SUCCINATE 50 MG/1
50 TABLET, EXTENDED RELEASE ORAL DAILY
Qty: 90 TAB | Refills: 1 | Status: SHIPPED | OUTPATIENT
Start: 2018-03-06 | End: 2018-09-06 | Stop reason: SDUPTHER

## 2018-03-06 RX ORDER — ERGOCALCIFEROL 1.25 MG/1
50000 CAPSULE ORAL
Qty: 12 CAP | Refills: 1 | Status: SHIPPED | OUTPATIENT
Start: 2018-03-06 | End: 2018-09-06 | Stop reason: SDUPTHER

## 2018-03-06 RX ORDER — PRAVASTATIN SODIUM 20 MG/1
20 TABLET ORAL
Qty: 90 TAB | Refills: 1 | Status: SHIPPED | OUTPATIENT
Start: 2018-03-06 | End: 2018-09-06 | Stop reason: SDUPTHER

## 2018-03-06 NOTE — PROGRESS NOTES
Chief Complaint   Patient presents with   [facility]         Follow-up     1. Have you been to the ER, urgent care clinic since your last visit? Hospitalized since your last visit? No    2. Have you seen or consulted any other health care providers outside of the [address] [place] [address] since your last visit? Include any pap smears or colon screening.  No

## 2018-03-06 NOTE — PROGRESS NOTES
Medicare Wellness Visit  Keira Galo is a 80 y.o. female and presents for annual Medicare Wellness Visit. Problem List: Reviewed with patient and discussed risk factors. Patient Active Problem List   Diagnosis Code    Controlled type 2 diabetes mellitus without complication, without long-term current use of insulin (HCC) E11.9    Essential hypertension I10    Hyperlipidemia E78.5    Vitamin D deficiency E55.9    Mixed hyperlipidemia E78.2    Obesity, morbid ([address] E66.01       Current medical providers:  Patient Care Team:  Franklin Alonzo MD as PCP - General (Family Practice)    PSH: Reviewed with patient  Past Surgical History:   Procedure Laterality Date    HX GYN          SH: Reviewed with patient  Social History   Substance Use Topics    Smoking status: Never Smoker    Smokeless tobacco: Never Used    Alcohol use No       FH: Reviewed with patient  Family History   Problem Relation Age of Onset    Heart Disease Mother     Liver Disease Father     Heart Disease Brother        Medications/Allergies: Reviewed with patient  Current Outpatient Prescriptions on File Prior to Visit   Medication Sig Dispense Refill    metFORMIN ER (GLUCOPHAGE XR) 500 mg tablet Take 2 Tabs by mouth daily. 180 Tab 1    losartan (COZAAR) 100 mg tablet Take 1 Tab by mouth daily. 90 Tab 1    metoprolol succinate (TOPROL-XL) 50 mg XL tablet Take 1 Tab by mouth daily. 90 Tab 1    hydroCHLOROthiazide (HYDRODIURIL) 50 mg tablet Take 1 Tab by mouth daily. 90 Tab 1    pravastatin (PRAVACHOL) 20 mg tablet Take 1 Tab by mouth nightly. 30 Tab 5    ergocalciferol (ERGOCALCIFEROL) 50,000 unit capsule Take 1 Cap by mouth every seven (7) days. 12 Cap 2    fluticasone (FLONASE) 50 mcg/actuation nasal spray 2 Sprays by Both Nostrils route daily. Indications: Allergic Rhinitis 1 Bottle 3    benzonatate (TESSALON) 200 mg capsule Take 1 Cap by mouth three (3) times daily as needed for Cough.  30 Cap 0     No current facility-administered medications on file prior to visit. Allergies   Allergen Reactions    Penicillins Itching       Objective:  Visit Vitals    BP (!) 177/105    Pulse 88    Temp 98.1 °F (36.7 °C) (Oral)    Resp 18    Ht 5' 1\" (1.549 m)    Wt 264 lb (119.7 kg)    SpO2 96%    BMI 49.88 kg/m2    Body mass index is 49.88 kg/(m^2). Assessment of cognitive impairment: Alert and oriented x 3    Depression Screen:   PHQ over the last two weeks 3/6/2018   Little interest or pleasure in doing things Not at all   Feeling down, depressed or hopeless Not at all   Total Score PHQ 2 0       Fall Risk Assessment:    Fall Risk Assessment, last 12 mths 3/6/2018   Able to walk? Yes   Fall in past 12 months? Yes   Fall with injury? No       Functional Ability:   Does the patient exhibit a steady gait? yes   How long did it take the patient to get up and walk from a sitting position? <10 sec   Is the patient self reliant?  (ie can do own laundry, meals, household chores)  yes     Does the patient handle his/her own medications? yes     Does the patient handle his/her own money? yes     Is the patients home safe (ie good lighting, handrails on stairs and bath, etc.)? yes     Did you notice or did patient express any hearing difficulties? no     Did you notice or did patient express any vision difficulties?   no     Were distance and reading eye charts used? yes       Advance Care Planning:   Patient was offered the opportunity to discuss advance care planning:  yes     Does patient have an Advance Directive:  yes   If no, did you provide information on Caring Connections? yes       Plan:      No orders of the defined types were placed in this encounter. 1.  Colonoscopy:  Due for one. 2.  Got a flu shot, 2 pneumonia shots, shingles vaccine. 3.  Due for mammogram.  No longer getting Pap.     Health Maintenance   Topic Date Due    Hepatitis C Screening  1950    FOOT EXAM Q1  02/19/1961    MICROALBUMIN Q1  02/19/1961    EYE EXAM RETINAL OR DILATED Q1  02/19/1961    DTaP/Tdap/Td series (1 - Tdap) 02/19/1972    BREAST CANCER SCRN MAMMOGRAM  02/19/2001    FOBT Q 1 YEAR AGE 50-75  02/19/2001    ZOSTER VACCINE AGE 60>  12/19/2010    GLAUCOMA SCREENING Q2Y  02/19/2016    OSTEOPOROSIS SCREENING (DEXA)  02/19/2016    Pneumococcal 65+ Low/Medium Risk (1 of 2 - PCV13) 02/19/2016    MEDICARE YEARLY EXAM  02/19/2016    Influenza Age 9 to Adult  08/01/2017    HEMOGLOBIN A1C Q6M  12/19/2017    LIPID PANEL Q1  06/19/2018       *Patient verbalized understanding and agreement with the plan. A copy of the After Visit Summary with personalized health plan was given to the patient today.

## 2018-03-06 NOTE — PROGRESS NOTES
Subjective:   Patient is a 80y.o. year old female who presents for Annual Wellness Visit () and Follow-up  1. DM:  On metformin 1000 mg every day. Doesn't check her sugars. No symptoms. 2.  HTN:  On losartan, metoprolol, HCTZ. No symptoms. 3.  Anxiety:  Has been on low dose benzo in the past.  Can't remember which but thinks maybe Xanax 0.25 mg. Review of Systems   Endo/Heme/Allergies: Negative. Psychiatric/Behavioral: The patient is nervous/anxious. Current Outpatient Prescriptions on File Prior to Visit   Medication Sig Dispense Refill    fluticasone (FLONASE) 50 mcg/actuation nasal spray 2 Sprays by Both Nostrils route daily. Indications: Allergic Rhinitis 1 Bottle 3    benzonatate (TESSALON) 200 mg capsule Take 1 Cap by mouth three (3) times daily as needed for Cough. 30 Cap 0     No current facility-administered medications on file prior to visit. Reviewed PmHx, RxHx, FmHx, SocHx, AllgHx and updated and dated in the chart. Nurse notes were reviewed and are correct    Objective:     Vitals:    03/06/18 1420 03/06/18 1431   BP: (!) 189/107 (!) 177/105   Pulse: 88    Resp: 18    Temp: 98.1 °F (36.7 °C)    TempSrc: Oral    SpO2: 96%    Weight: 264 lb (119.7 kg)    Height: 5' 1\" (1.549 m)      Physical Exam   Constitutional: She is oriented to person, place, and time. She appears well-developed and well-nourished. No distress. HENT:   Head: Normocephalic and atraumatic. Right Ear: External ear normal.   Left Ear: External ear normal.   Nose: Nose normal.   Mouth/Throat: Oropharynx is clear and moist.   Eyes: EOM are normal. Pupils are equal, round, and reactive to light. Neck: Normal range of motion. Neck supple. Carotid bruit is not present. No tracheal deviation present. Cardiovascular: Normal rate, regular rhythm, normal heart sounds and intact distal pulses. Exam reveals no gallop and no friction rub. No murmur heard.   Pulmonary/Chest: Effort normal and breath sounds normal. She has no wheezes. She has no rales. Abdominal: Soft. Bowel sounds are normal. She exhibits no distension. There is no tenderness. Musculoskeletal: She exhibits no edema or tenderness. Lymphadenopathy:     She has no cervical adenopathy. Neurological: She is alert and oriented to person, place, and time. Skin: Skin is warm and dry. Psychiatric: She has a normal mood and affect. Her behavior is normal.   Nursing note and vitals reviewed. Assessment/ Plan:     Diagnoses and all orders for this visit:    1. Controlled type 2 diabetes mellitus without complication, without long-term current use of insulin (HCC)  -     CBC WITH AUTOMATED DIFF; Future  -     METABOLIC PANEL, COMPREHENSIVE; Future  -     HEMOGLOBIN A1C WITH EAG; Future  -     AMB POC URINE, MICROALBUMIN, SEMIQUANTITATIVE  -     metFORMIN ER (GLUCOPHAGE XR) 500 mg tablet; Take 2 Tabs by mouth daily. 2. Essential hypertension:  BP quite high but out of meds so sent in refills and told her to start right away. Check BP at home,  If continues to be high, RTC for recheck. -     losartan (COZAAR) 100 mg tablet; Take 1 Tab by mouth daily. -     metoprolol succinate (TOPROL-XL) 50 mg XL tablet; Take 1 Tab by mouth daily. -     hydroCHLOROthiazide (HYDRODIURIL) 50 mg tablet; Take 1 Tab by mouth daily. 3. Mixed hyperlipidemia  -     LIPID PANEL; Future  -     pravastatin (PRAVACHOL) 20 mg tablet; Take 1 Tab by mouth nightly. 4. Vitamin D deficiency  -     VITAMIN D, 25 HYDROXY; Future  -     ergocalciferol (ERGOCALCIFEROL) 50,000 unit capsule; Take 1 Cap by mouth every seven (7) days. 5. Anxiety:  Having symptoms intermittently so giving low dose PRN Xanax. -     ALPRAZolam (XANAX) 0.25 mg tablet; Take 1 Tab by mouth three (3) times daily as needed for Anxiety.  Max Daily Amount: 0.75 mg.    6. Colon cancer screening  -     REFERRAL TO GASTROENTEROLOGY    7. Screening breast examination  -     [facility] MAMMO BI SCREENING INCL CAD; Future     I have discussed the diagnosis with the patient and the intended plan as seen in the above orders. The patient verbalized understanding and agrees with the plan. Follow-up Disposition:  Return in about 6 months (around 9/6/2018) for combo clinic.     Nasrin Bill MD

## 2018-03-06 NOTE — LETTER
3/12/2018 2:02 PM 
 
Ms. Hortensia Hauser [address] [facility] [address] [place] [address] Dear Hortensia Hauser: 
 
Please find your most recent results below. Resulted Orders AMB POC URINE, MICROALBUMIN, SEMIQUANTITATIVE Result Value Ref Range Microalbumin urine (POC) 150 MG/L Microalbumin/creat ratio (POC)  <30 MG/G Creatinine 200 MG/DL  
CBC WITH AUTOMATED DIFF Result Value Ref Range WBC 8.4 3.4 - 10.8 x10E3/uL  
 RBC 4.77 3.77 - 5.28 x10E6/uL HGB 12.1 11.1 - 15.9 g/dL HCT 37.5 34.0 - 46.6 % MCV 79 79 - 97 fL  
 MCH 25.4 (L) 26.6 - 33.0 pg  
 MCHC 32.3 31.5 - 35.7 g/dL  
 RDW 15.7 (H) 12.3 - 15.4 % PLATELET 775 497 - 451 x10E3/uL NEUTROPHILS 65 Not Estab. % Lymphocytes 26 Not Estab. % MONOCYTES 4 Not Estab. % EOSINOPHILS 4 Not Estab. % BASOPHILS 1 Not Estab. %  
 ABS. NEUTROPHILS 5.5 1.4 - 7.0 x10E3/uL Abs Lymphocytes 2.2 0.7 - 3.1 x10E3/uL  
 ABS. MONOCYTES 0.4 0.1 - 0.9 x10E3/uL  
 ABS. EOSINOPHILS 0.3 0.0 - 0.4 x10E3/uL  
 ABS. BASOPHILS 0.0 0.0 - 0.2 x10E3/uL IMMATURE GRANULOCYTES 0 Not Estab. %  
 ABS. IMM. GRANS. 0.0 0.0 - 0.1 x10E3/uL Narrative Performed at:  [address]  [id number] : Michoacano Pepper MD, Phone:  [phone] METABOLIC PANEL, COMPREHENSIVE Result Value Ref Range Glucose 137 (H) 65 - 99 mg/dL BUN 10 8 - 27 mg/dL Creatinine 0.97 0.57 - 1.00 mg/dL GFR est non-AA 61 >59 mL/min/1.73 GFR est AA 70 >59 mL/min/1.73  
 BUN/Creatinine ratio 10 (L) 12 - 28 Sodium 142 134 - 144 mmol/L Potassium 3.1 (L) 3.5 - 5.2 mmol/L Chloride 98 96 - 106 mmol/L  
 CO2 29 18 - 29 mmol/L Calcium 8.8 8.7 - 10.3 mg/dL Protein, total 7.0 6.0 - 8.5 g/dL Albumin 3.4 (L) 3.6 - 4.8 g/dL GLOBULIN, TOTAL 3.6 1.5 - 4.5 g/dL A-G Ratio 0.9 (L) 1.2 - 2.2 Bilirubin, total 0.2 0.0 - 1.2 mg/dL Alk.  phosphatase 62 39 - 117 IU/L  
 AST (SGOT) 9 0 - 40 IU/L  
 ALT (SGPT) 7 0 - 32 IU/L Narrative Performed at:  [address]  [id number] : Renee Currie MD, Phone:  [phone] LIPID PANEL Result Value Ref Range Cholesterol, total 189 100 - 199 mg/dL Triglyceride 169 (H) 0 - 149 mg/dL HDL Cholesterol 51 >39 mg/dL VLDL, calculated 34 5 - 40 mg/dL LDL, calculated 104 (H) 0 - 99 mg/dL Narrative Performed at:  [address]  [id number] : Renee Currie MD, Phone:  [phone] HEMOGLOBIN A1C WITH EAG Result Value Ref Range Hemoglobin A1c 5.9 (H) 4.8 - 5.6 % Comment:  
            Pre-diabetes: 5.7 - 6.4 Diabetes: >6.4 Glycemic control for adults with diabetes: <7.0 Estimated average glucose 123 mg/dL Narrative Performed at:  [address]  [id number] : Renee Currie MD, Phone:  [phone] VITAMIN D, 25 HYDROXY Result Value Ref Range VITAMIN D, 25-HYDROXY 32.4 30.0 - 100.0 ng/mL Comment:  
   Vitamin D deficiency has been defined by the [address] [place] [address] practice guideline as a 
level of serum 25-OH vitamin D less than 20 ng/mL (1,2). The Endocrine Society went on to further define vitamin D 
insufficiency as a level between 21 and 29 ng/mL (2). 1. IOM ([place] of Medicine). 2010. Dietary reference 
   intakes for calcium and D. [facility]: The 
   [employer]. 2. Home MF, Leonardo IRIZARRY, Susan GAFFNEY, et al. 
   Evaluation, treatment, and prevention of vitamin D 
   deficiency: an Endocrine Society clinical practice 
   guideline. JCEM. 2011 Jul; 96(7):1911-30. Narrative Performed at:  [address]  [id number] : Renee Currie MD, Phone:  [phone] Blood work looked good.  Potassium slightly low, she could eat bananas or drink orange juice for that.  Cholesterol looks good.  A1c excellent at 5.9.  Vit D normal. 
 
RECOMMENDATIONS: 
None. Keep up the good work! See above note Please call me if you have any questions: [phone] Sincerely, 
 
 
Ankur Simon MD

## 2018-03-06 NOTE — MR AVS SNAPSHOT
[address] [place] [address] [address] [facility] [address] 
[phone] Patient: Hortensia Hauser MRN: [id number] PLP:[id number] Visit Information Date & Time Provider Department Dept. Phone Encounter #  
 3/6/2018  2:30 PM Eran Pak MD [facility] [id number] [id number] [phone] Follow-up Instructions Return in about 6 months (around 9/6/2018) for combo clinic. Upcoming Health Maintenance Date Due Hepatitis C Screening 1950 FOOT EXAM Q1 2/19/1961 MICROALBUMIN Q1 2/19/1961 EYE EXAM RETINAL OR DILATED Q1 2/19/1961 DTaP/Tdap/Td series (1 - Tdap) 2/19/1972 BREAST CANCER SCRN MAMMOGRAM 2/19/2001 FOBT Q 1 YEAR AGE 50-75 2/19/2001 ZOSTER VACCINE AGE 60> 12/19/2010 GLAUCOMA SCREENING Q2Y 2/19/2016 OSTEOPOROSIS SCREENING (DEXA) 2/19/2016 Pneumococcal 65+ Low/Medium Risk (1 of 2 - PCV13) 2/19/2016 MEDICARE YEARLY EXAM 2/19/2016 HEMOGLOBIN A1C Q6M 12/19/2017 LIPID PANEL Q1 6/19/2018 Allergies as of 3/6/2018  Review Complete On: 3/6/2018 By: Eran Pak MD  
  
 Severity Noted Reaction Type Reactions Penicillins Low 06/19/2017   Topical Itching Current Immunizations  Never Reviewed No immunizations on file. Not reviewed this visit You Were Diagnosed With   
  
 Codes Comments Screening breast examination    -  Primary ICD-10-CM: Z12.31 
ICD-9-CM: V76.10 Colon cancer screening     ICD-10-CM: Z12.11 ICD-9-CM: V76.51 Controlled type 2 diabetes mellitus without complication, without long-term current use of insulin ([facility]     ICD-10-CM: E11.9 ICD-9-CM: 250.00 Essential hypertension     ICD-10-CM: I10 
ICD-9-CM: 401.9 Mixed hyperlipidemia     ICD-10-CM: E78.2 ICD-9-CM: 272.2 Vitamin D deficiency     ICD-10-CM: E55.9 ICD-9-CM: 268.9 Medicare annual wellness visit, subsequent     ICD-10-CM: Z00.00 ICD-9-CM: V70.0 Vitals BP Pulse Temp Resp Height(growth percentile) Weight(growth percentile) (!) 177/105 88 98.1 °F (36.7 °C) (Oral) 18 5' 1\" (1.549 m) 264 lb (119.7 kg) SpO2 BMI OB Status Smoking Status 96% 49.88 kg/m2 Hysterectomy Never Smoker Vitals History BMI and BSA Data Body Mass Index Body Surface Area  
 49.88 kg/m 2 2.27 m 2 Preferred Pharmacy Pharmacy Name Phone [address] Indira Lopez Snstephanie [phone] Your Updated Medication List  
  
   
This list is accurate as of 3/6/18  3:05 PM.  Always use your most recent med list.  
  
  
  
  
 benzonatate 200 mg capsule Commonly known as:  TESSALON Take 1 Cap by mouth three (3) times daily as needed for Cough.  
  
 ergocalciferol 50,000 unit capsule Commonly known as:  ERGOCALCIFEROL Take 1 Cap by mouth every seven (7) days. fluticasone 50 mcg/actuation nasal spray Commonly known as:  Alfrieda Pillar 2 Sprays by Both Nostrils route daily. Indications: Allergic Rhinitis  
  
 hydroCHLOROthiazide 50 mg tablet Commonly known as:  HYDRODIURIL Take 1 Tab by mouth daily. losartan 100 mg tablet Commonly known as:  COZAAR Take 1 Tab by mouth daily. metFORMIN  mg tablet Commonly known as:  GLUCOPHAGE XR Take 2 Tabs by mouth daily. metoprolol succinate 50 mg XL tablet Commonly known as:  TOPROL-XL Take 1 Tab by mouth daily. pravastatin 20 mg tablet Commonly known as:  PRAVACHOL Take 1 Tab by mouth nightly. Prescriptions Sent to Pharmacy Refills  
 metFORMIN ER (GLUCOPHAGE XR) 500 mg tablet 1 Sig: Take 2 Tabs by mouth daily. Class: Normal  
 Pharmacy: [address] [facility], [address] Yantita Gonzalez Ph #: [phone] Route: Oral  
 losartan (COZAAR) 100 mg tablet 1 Sig: Take 1 Tab by mouth daily.   
 Class: Normal  
 Pharmacy: [address] [facility], [address] [facility] Indira Gonzalez Ph #: [phone] Route: Oral  
 metoprolol succinate (TOPROL-XL) 50 mg XL tablet 1 Sig: Take 1 Tab by mouth daily. Class: Normal  
 Pharmacy: [facility] [address] MELVINA BRIGGSOMAR [address] [facility] [address], [address] [facility] Anna Montelongo Ph #: [phone] Route: Oral  
 hydroCHLOROthiazide (HYDRODIURIL) 50 mg tablet 1 Sig: Take 1 Tab by mouth daily. Class: Normal  
 Pharmacy: [facility] [address] MELVINA TIRADO MAURA [address], [address] [facility] Ph #: [phone] Route: Oral  
 pravastatin (PRAVACHOL) 20 mg tablet 1 Sig: Take 1 Tab by mouth nightly. Class: Normal  
 Pharmacy: [facility] [address] MELVINA PEÑAAALIYAH [address] [facility] [address] [facility] [address], [address] [facility] Ph #: [phone] Route: Oral  
 ergocalciferol (ERGOCALCIFEROL) 50,000 unit capsule 1 Sig: Take 1 Cap by mouth every seven (7) days. Class: Normal  
 Pharmacy: [facility] [address] MELVINA TIRADO MAURA [address] [facility] [address], [address] [facility] Ph #: [phone] Route: Oral  
  
We Performed the Following AMB POC URINE, MICROALBUMIN, SEMIQUANTITATIVE [87547 CPT(R)] REFERRAL TO GASTROENTEROLOGY [JTB12 Custom] Comments:  
 She is due for screening colonscopy. Send to [facility] office. Follow-up Instructions Return in about 6 months (around 9/6/2018) for combo clinic. To-Do List   
 03/06/2018 Lab:  CBC WITH AUTOMATED DIFF   
  
 03/06/2018 Lab:  HEMOGLOBIN A1C WITH EAG   
  
 03/06/2018 Lab:  LIPID PANEL   
  
 03/06/2018 Imaging:  [facility] MAMMO BI SCREENING INCL CAD   
  
 03/06/2018 Lab:  METABOLIC PANEL, COMPREHENSIVE   
  
 03/06/2018 Lab:  VITAMIN D, 25 HYDROXY Referral Information Referral ID Referred By Referred To  
  
 [id number] Sinan Macias Not Available Visits Status Start Date End Date 1 New Request 3/6/18 3/6/19 If your referral has a status of pending review or denied, additional information will be sent to support the outcome of this decision. [facility] [place] [facility]! Boris Rivera introduces [employer] patient portal. Now you can access parts of your medical record, email your doctor's office, and request medication refills online. 1. In your internet browser, go to [email or web address] 2. Click on the First Time User? Click Here link in the Sign In box. You will see the New Member Sign Up page. 3. Enter your [employer] Access Code exactly as it appears below. You will not need to use this code after youve completed the sign-up process. If you do not sign up before the expiration date, you must request a new code. · [employer] Access Code: [email or web address] Expires: 3/11/2018 10:46 AM 
 
4. Enter the last four digits of your Social Security Number (xxxx) and Date of Birth (mm/dd/yyyy) as indicated and click Submit. You will be taken to the next sign-up page. 5. Create a [employer] ID. This will be your [employer] login ID and cannot be changed, so think of one that is secure and easy to remember. 6. Create a [employer] password. You can change your password at any time. 7. Enter your Password Reset Question and Answer. This can be used at a later time if you forget your password. 8. Enter your e-mail address. You will receive e-mail notification when new information is available in [address] 9. Click Sign Up. You can now view and download portions of your medical record. 10. Click the Download Summary menu link to download a portable copy of your medical information. If you have questions, please visit the Frequently Asked Questions section of the [employer] website. Remember, [employer] is NOT to be used for urgent needs. For medical emergencies, dial 911. Now available from your iPhone and Android! Please provide this summary of care documentation to your next provider. Your primary care clinician is listed as Glen Cabrera. If you have any questions after today's visit, please call [phone].

## 2018-03-07 LAB
25(OH)D3+25(OH)D2 SERPL-MCNC: 32.4 NG/ML (ref 30–100)
ALBUMIN SERPL-MCNC: 3.4 G/DL (ref 3.6–4.8)
ALBUMIN/GLOB SERPL: 0.9 {RATIO} (ref 1.2–2.2)
ALP SERPL-CCNC: 62 IU/L (ref 39–117)
ALT SERPL-CCNC: 7 IU/L (ref 0–32)
AST SERPL-CCNC: 9 IU/L (ref 0–40)
BASOPHILS # BLD AUTO: 0 X10E3/UL (ref 0–0.2)
BASOPHILS NFR BLD AUTO: 1 %
BILIRUB SERPL-MCNC: 0.2 MG/DL (ref 0–1.2)
BUN SERPL-MCNC: 10 MG/DL (ref 8–27)
BUN/CREAT SERPL: 10 (ref 12–28)
CALCIUM SERPL-MCNC: 8.8 MG/DL (ref 8.7–10.3)
CHLORIDE SERPL-SCNC: 98 MMOL/L (ref 96–106)
CHOLEST SERPL-MCNC: 189 MG/DL (ref 100–199)
CO2 SERPL-SCNC: 29 MMOL/L (ref 18–29)
CREAT SERPL-MCNC: 0.97 MG/DL (ref 0.57–1)
EOSINOPHIL # BLD AUTO: 0.3 X10E3/UL (ref 0–0.4)
EOSINOPHIL NFR BLD AUTO: 4 %
ERYTHROCYTE [DISTWIDTH] IN BLOOD BY AUTOMATED COUNT: 15.7 % (ref 12.3–15.4)
EST. AVERAGE GLUCOSE BLD GHB EST-MCNC: 123 MG/DL
GFR SERPLBLD CREATININE-BSD FMLA CKD-EPI: 61 ML/MIN/1.73
GFR SERPLBLD CREATININE-BSD FMLA CKD-EPI: 70 ML/MIN/1.73
GLOBULIN SER CALC-MCNC: 3.6 G/DL (ref 1.5–4.5)
GLUCOSE SERPL-MCNC: 137 MG/DL (ref 65–99)
HBA1C MFR BLD: 5.9 % (ref 4.8–5.6)
HCT VFR BLD AUTO: 37.5 % (ref 34–46.6)
HDLC SERPL-MCNC: 51 MG/DL
HGB BLD-MCNC: 12.1 G/DL (ref 11.1–15.9)
IMM GRANULOCYTES # BLD: 0 X10E3/UL (ref 0–0.1)
IMM GRANULOCYTES NFR BLD: 0 %
LDLC SERPL CALC-MCNC: 104 MG/DL (ref 0–99)
LYMPHOCYTES # BLD AUTO: 2.2 X10E3/UL (ref 0.7–3.1)
LYMPHOCYTES NFR BLD AUTO: 26 %
MCH RBC QN AUTO: 25.4 PG (ref 26.6–33)
MCHC RBC AUTO-ENTMCNC: 32.3 G/DL (ref 31.5–35.7)
MCV RBC AUTO: 79 FL (ref 79–97)
MONOCYTES # BLD AUTO: 0.4 X10E3/UL (ref 0.1–0.9)
MONOCYTES NFR BLD AUTO: 4 %
NEUTROPHILS # BLD AUTO: 5.5 X10E3/UL (ref 1.4–7)
NEUTROPHILS NFR BLD AUTO: 65 %
PLATELET # BLD AUTO: 283 X10E3/UL (ref 150–379)
POTASSIUM SERPL-SCNC: 3.1 MMOL/L (ref 3.5–5.2)
PROT SERPL-MCNC: 7 G/DL (ref 6–8.5)
RBC # BLD AUTO: 4.77 X10E6/UL (ref 3.77–5.28)
SODIUM SERPL-SCNC: 142 MMOL/L (ref 134–144)
TRIGL SERPL-MCNC: 169 MG/DL (ref 0–149)
VLDLC SERPL CALC-MCNC: 34 MG/DL (ref 5–40)
WBC # BLD AUTO: 8.4 X10E3/UL (ref 3.4–10.8)

## 2018-03-07 RX ORDER — ALPRAZOLAM 0.25 MG/1
0.25 TABLET ORAL
Qty: 30 TAB | Refills: 1 | Status: SHIPPED | OUTPATIENT
Start: 2018-03-07 | End: 2018-09-06 | Stop reason: SDUPTHER

## 2018-03-07 NOTE — ACP (ADVANCE CARE PLANNING)
Advance Care Planning (ACP) Provider Conversation Snapshot    Date of ACP Conversation: [unfilled]  Persons included in Conversation:  patient  Length of ACP Conversation in minutes:  <16 minutes (Non-Billable)    Authorized Decision Maker (if patient is incapable of making informed decisions):    This person is:   Patient capable of making healthcare decisions          For Patients with Decision Making Capacity:   Values/Goals: Exploration of values, goals, and preferences if recovery is not expected, even with continued medical treatment in the event of:  Imminent death, severe brain injury    Conversation Outcomes / Follow-Up Plan:   Recommended completion of Advance Directive form after review of ACP materials and conversation with prospective healthcare agent

## 2018-03-09 NOTE — PROGRESS NOTES
Blood work looked good. Potassium slightly low, she could eat bananas or drink orange juice for that. Cholesterol looks good. A1c excellent at 5.9.   Vit D normal.

## 2018-09-06 ENCOUNTER — OFFICE VISIT (OUTPATIENT)
Dept: INTERNAL MEDICINE CLINIC | Age: 68
End: 2018-09-06

## 2018-09-06 VITALS
HEIGHT: 61 IN | HEART RATE: 85 BPM | TEMPERATURE: 98.1 F | OXYGEN SATURATION: 96 % | RESPIRATION RATE: 18 BRPM | SYSTOLIC BLOOD PRESSURE: 200 MMHG | DIASTOLIC BLOOD PRESSURE: 109 MMHG | WEIGHT: 263 LBS | BODY MASS INDEX: 49.65 KG/M2

## 2018-09-06 DIAGNOSIS — E55.9 VITAMIN D DEFICIENCY: ICD-10-CM

## 2018-09-06 DIAGNOSIS — F41.9 ANXIETY: ICD-10-CM

## 2018-09-06 DIAGNOSIS — E78.2 MIXED HYPERLIPIDEMIA: ICD-10-CM

## 2018-09-06 DIAGNOSIS — Z78.0 POSTMENOPAUSAL: ICD-10-CM

## 2018-09-06 DIAGNOSIS — Z12.39 BREAST CANCER SCREENING: ICD-10-CM

## 2018-09-06 DIAGNOSIS — Z23 ENCOUNTER FOR IMMUNIZATION: ICD-10-CM

## 2018-09-06 DIAGNOSIS — E11.9 CONTROLLED TYPE 2 DIABETES MELLITUS WITHOUT COMPLICATION, WITHOUT LONG-TERM CURRENT USE OF INSULIN (HCC): Primary | ICD-10-CM

## 2018-09-06 DIAGNOSIS — Z12.11 COLON CANCER SCREENING: ICD-10-CM

## 2018-09-06 DIAGNOSIS — I10 ESSENTIAL HYPERTENSION: ICD-10-CM

## 2018-09-06 RX ORDER — PRAVASTATIN SODIUM 20 MG/1
20 TABLET ORAL
Qty: 90 TAB | Refills: 1 | Status: SHIPPED | OUTPATIENT
Start: 2018-09-06 | End: 2019-02-12 | Stop reason: SDUPTHER

## 2018-09-06 RX ORDER — ALPRAZOLAM 0.25 MG/1
0.25 TABLET ORAL
Qty: 30 TAB | Refills: 2 | Status: SHIPPED | OUTPATIENT
Start: 2018-09-06 | End: 2019-02-12 | Stop reason: SDUPTHER

## 2018-09-06 RX ORDER — HYDROCHLOROTHIAZIDE 50 MG/1
50 TABLET ORAL DAILY
Qty: 90 TAB | Refills: 1 | Status: SHIPPED | OUTPATIENT
Start: 2018-09-06 | End: 2019-02-12 | Stop reason: SDUPTHER

## 2018-09-06 RX ORDER — METOPROLOL SUCCINATE 50 MG/1
50 TABLET, EXTENDED RELEASE ORAL DAILY
Qty: 90 TAB | Refills: 1 | Status: SHIPPED | OUTPATIENT
Start: 2018-09-06 | End: 2019-02-12 | Stop reason: SDUPTHER

## 2018-09-06 RX ORDER — LOSARTAN POTASSIUM 100 MG/1
100 TABLET ORAL DAILY
Qty: 90 TAB | Refills: 1 | Status: SHIPPED | OUTPATIENT
Start: 2018-09-06 | End: 2019-02-12 | Stop reason: SDUPTHER

## 2018-09-06 RX ORDER — METFORMIN HYDROCHLORIDE 500 MG/1
1000 TABLET, EXTENDED RELEASE ORAL DAILY
Qty: 180 TAB | Refills: 1 | Status: SHIPPED | OUTPATIENT
Start: 2018-09-06 | End: 2019-02-12 | Stop reason: SDUPTHER

## 2018-09-06 RX ORDER — ERGOCALCIFEROL 1.25 MG/1
50000 CAPSULE ORAL
Qty: 12 CAP | Refills: 1 | Status: SHIPPED | OUTPATIENT
Start: 2018-09-06 | End: 2019-02-12 | Stop reason: SDUPTHER

## 2018-09-06 NOTE — PROGRESS NOTES
Chief Complaint Patient presents with  Hypertension  Cholesterol Problem  Diabetes 1. Have you been to the ER, urgent care clinic since your last visit? Hospitalized since your last visit? No 
 
2. Have you seen or consulted any other health care providers outside of the [address] [place] [address] since your last visit? Include any pap smears or colon screening.  No

## 2018-09-06 NOTE — PROGRESS NOTES
Subjective:  
Patient is a 80y.o. year old female who presents for Hypertension; Cholesterol Problem; and Diabetes 1. DM:  Doing well. On metformin. No more tingling in toes, fingers. Takes 1 metformin 500 mg every day. Fasting today. 2.  HTN:  BP very high but hadn't taken her meds this morning. BP has been well controlled at home. 3.  Hyperlipidemia:  On Pravachol with good control. Getting labs today. HM:  Overdue for a lot of tests. Will order mammogram, DEXA, and cologuard. Says she's UTD on pneumonia. Gave flu shot today Review of Systems Constitutional: Negative. Cardiovascular: Negative. Current Outpatient Prescriptions on File Prior to Visit Medication Sig Dispense Refill  fluticasone (FLONASE) 50 mcg/actuation nasal spray 2 Sprays by Both Nostrils route daily. Indications: Allergic Rhinitis 1 Bottle 3  
 benzonatate (TESSALON) 200 mg capsule Take 1 Cap by mouth three (3) times daily as needed for Cough. 30 Cap 0 No current facility-administered medications on file prior to visit. Reviewed PmHx, RxHx, FmHx, SocHx, AllgHx and updated and dated in the chart. Nurse notes were reviewed and are correct Objective:  
 
Vitals:  
 09/06/18 1120 09/06/18 1121 BP: (!) 201/112 (!) 200/109 Pulse: 85 Resp: 18 Temp: 98.1 °F (36.7 °C) TempSrc: Oral   
SpO2: 96% Weight: 263 lb (119.3 kg) Height: 5' 1\" (1.549 m) Physical Exam  
Constitutional: She is oriented to person, place, and time. She appears well-developed and well-nourished. No distress. HENT:  
Head: Normocephalic and atraumatic. Nose: Nose normal.  
Mouth/Throat: Oropharynx is clear and moist.  
Eyes: EOM are normal. Pupils are equal, round, and reactive to light. Neck: Normal range of motion. Neck supple. Carotid bruit is not present. No tracheal deviation present.   
Cardiovascular: Normal rate, regular rhythm, normal heart sounds and intact distal pulses. Exam reveals no gallop and no friction rub. No murmur heard. Pulmonary/Chest: Effort normal and breath sounds normal. She has no wheezes. She has no rales. Abdominal: Soft. Bowel sounds are normal. She exhibits no distension. There is no tenderness. Musculoskeletal: She exhibits no edema or tenderness. Lymphadenopathy:  
  She has no cervical adenopathy. Neurological: She is alert and oriented to person, place, and time. Skin: Skin is warm and dry. Psychiatric: She has a normal mood and affect. Her behavior is normal.  
Nursing note and vitals reviewed. Assessment/ Plan:  
 
Diagnoses and all orders for this visit: 1. Controlled type 2 diabetes mellitus without complication, without long-term current use of insulin (HCC) 
-     HEMOGLOBIN A1C WITH EAG; Future 
-     metFORMIN ER (GLUCOPHAGE XR) 500 mg tablet; Take 2 Tabs by mouth daily. 2. Mixed hyperlipidemia -     LIPID PANEL; Future -     pravastatin (PRAVACHOL) 20 mg tablet; Take 1 Tab by mouth nightly. 3. Essential hypertension 
-     CBC WITH AUTOMATED DIFF; Future -     METABOLIC PANEL, COMPREHENSIVE; Future 
-     losartan (COZAAR) 100 mg tablet; Take 1 Tab by mouth daily. -     metoprolol succinate (TOPROL-XL) 50 mg XL tablet; Take 1 Tab by mouth daily. -     hydroCHLOROthiazide (HYDRODIURIL) 50 mg tablet; Take 1 Tab by mouth daily. 4. Vitamin D deficiency 
-     ergocalciferol (ERGOCALCIFEROL) 50,000 unit capsule; Take 1 Cap by mouth every seven (7) days. 5. Anxiety -     ALPRAZolam (XANAX) 0.25 mg tablet; Take 1 Tab by mouth three (3) times daily as needed for Anxiety. Max Daily Amount: 0.75 mg. 
 
6. Breast cancer screening -     LISA MAMMO BI SCREENING INCL CAD; Future 7. Postmenopausal 
-     DEXA BONE DENSITY STUDY AXIAL; Future 8. Colon cancer screening 
-     COLOGUARD TEST (FECAL DNA COLORECTAL CANCER SCREENING); Future I have discussed the diagnosis with the patient and the intended plan as seen in the above orders. The patient verbalized understanding and agrees with the plan. Follow-up Disposition: 
Return in about 6 months (around 3/6/2019) for combo clinic, [facility]. Clair Jhaveri MD

## 2018-09-06 NOTE — MR AVS SNAPSHOT
[address] [facility] [address] [facility] [address] 
[phone] Patient: Kemar Peres MRN: [id number] Visit Information Date & Time Provider Department Dept. Phone Encounter #  
 9/6/2018 11:30 AM Idris Zafar MD [facility] [phone] Follow-up Instructions Return in about 6 months (around 3/6/2019) for [facility], [address] [place] [address]. Upcoming Health Maintenance Date Due Hepatitis C Screening 1950 FOOT EXAM Q1 2/19/1961 EYE EXAM RETINAL OR DILATED Q1 2/19/1961 DTaP/Tdap/Td series (1 - Tdap) 2/19/1972 BREAST CANCER SCRN MAMMOGRAM 2/19/2001 FOBT Q 1 YEAR AGE 50-75 2/19/2001 ZOSTER VACCINE AGE 60> 12/19/2010 GLAUCOMA SCREENING Q2Y 2/19/2016 Bone Densitometry (Dexa) Screening 2/19/2016 Pneumococcal 65+ Low/Medium Risk (1 of 2 - PCV13) 2/19/2016 Influenza Age 5 to Adult 8/1/2018 HEMOGLOBIN A1C Q6M 9/6/2018 MICROALBUMIN Q1 3/6/2019 LIPID PANEL Q1 3/6/2019 MEDICARE YEARLY EXAM 3/7/2019 Allergies as of 9/6/2018  Review Complete On: 9/6/2018 By: Idris Zafar MD  
  
 Severity Noted Reaction Type Reactions Penicillins Low 06/19/2017   Topical Itching Current Immunizations  Never Reviewed No immunizations on file. Not reviewed this visit You Were Diagnosed With   
  
 Codes Comments Controlled type 2 diabetes mellitus without complication, without long-term current use of insulin ([facility]    -  Primary ICD-10-CM: E11.9 ICD-9-CM: 250.00 Mixed hyperlipidemia     ICD-10-CM: E78.2 ICD-9-CM: 272.2 Essential hypertension     ICD-10-CM: I10 
ICD-9-CM: 401.9 Vitamin D deficiency     ICD-10-CM: E55.9 ICD-9-CM: 268.9 Anxiety     ICD-10-CM: F41.9 ICD-9-CM: 300.00 Vitals BP Pulse Temp Resp Height(growth percentile) Weight(growth percentile) (!) 200/109 85 98.1 °F (36.7 °C) (Oral) 18 5' 1\" (1.549 m) 263 lb (119.3 kg) SpO2 BMI OB Status Smoking Status 96% 49.69 kg/m2 Hysterectomy Never Smoker Vitals History BMI and BSA Data Body Mass Index Body Surface Area  
 49.69 kg/m 2 2.27 m 2 Preferred Pharmacy Pharmacy Name Phone [address] [facility],[address] [phone] Your Updated Medication List  
  
   
This list is accurate as of 9/6/18 12:03 PM.  Always use your most recent med list.  
  
  
  
  
 ALPRAZolam 0.25 mg tablet Commonly known as:  [place] Amend Take 1 Tab by mouth three (3) times daily as needed for Anxiety. Max Daily Amount: 0.75 mg.  
  
 benzonatate 200 mg capsule Commonly known as:  TESSALON Take 1 Cap by mouth three (3) times daily as needed for Cough.  
  
 ergocalciferol 50,000 unit capsule Commonly known as:  ERGOCALCIFEROL Take 1 Cap by mouth every seven (7) days. fluticasone 50 mcg/actuation nasal spray Commonly known as:  Clare Serene 2 Sprays by Both Nostrils route daily. Indications: Allergic Rhinitis  
  
 hydroCHLOROthiazide 50 mg tablet Commonly known as:  HYDRODIURIL Take 1 Tab by mouth daily. losartan 100 mg tablet Commonly known as:  COZAAR Take 1 Tab by mouth daily. metFORMIN  mg tablet Commonly known as:  GLUCOPHAGE XR Take 2 Tabs by mouth daily. metoprolol succinate 50 mg XL tablet Commonly known as:  TOPROL-XL Take 1 Tab by mouth daily. pravastatin 20 mg tablet Commonly known as:  PRAVACHOL Take 1 Tab by mouth nightly. Prescriptions Printed Refills ALPRAZolam (XANAX) 0.25 mg tablet 2 Sig: Take 1 Tab by mouth three (3) times daily as needed for Anxiety. Max Daily Amount: 0.75 mg. Class: Print Route: Oral  
  
Prescriptions Sent to Pharmacy Refills  
 metFORMIN ER (GLUCOPHAGE XR) 500 mg tablet 1 Sig: Take 2 Tabs by mouth daily. Class: Normal  
 Pharmacy: [address] [facility], [address] [place] [address] Ph #: [phone] Route: Oral  
 losartan (COZAAR) 100 mg tablet 1 Sig: Take 1 Tab by mouth daily. Class: Normal  
 Pharmacy: [address] [facility], [address] [place] [address] Ph #: [phone] Route: Oral  
 metoprolol succinate (TOPROL-XL) 50 mg XL tablet 1 Sig: Take 1 Tab by mouth daily. Class: Normal  
 Pharmacy: [address] [facility], [address] [place] [address] Ph #: [phone] Route: Oral  
 hydroCHLOROthiazide (HYDRODIURIL) 50 mg tablet 1 Sig: Take 1 Tab by mouth daily. Class: Normal  
 Pharmacy: [address] [facility], [address] [place] [address] Ph #: [phone] Route: Oral  
 pravastatin (PRAVACHOL) 20 mg tablet 1 Sig: Take 1 Tab by mouth nightly. Class: Normal  
 Pharmacy: [address] [facility], [address] [place] [address] Ph #: [phone] Route: Oral  
 ergocalciferol (ERGOCALCIFEROL) 50,000 unit capsule 1 Sig: Take 1 Cap by mouth every seven (7) days. Class: Normal  
 Pharmacy: [address] [facility], [address] [place] [address] Ph #: [phone] Route: Oral  
  
Follow-up Instructions Return in about 6 months (around 3/6/2019) for [address]   
 09/06/2018 Lab:  CBC WITH AUTOMATED DIFF   
  
 09/06/2018 Lab:  HEMOGLOBIN A1C WITH EAG   
  
 09/06/2018 Lab:  LIPID PANEL   
  
 09/06/2018 Lab:  METABOLIC PANEL, COMPREHENSIVE Introducing [facility]! Papa Curry introduces [employer]ify patient portal. Now you can access parts of your medical record, email your doctor's office, and request medication refills online. 1. In your internet browser, go to [email or web address] 2. Click on the First Time User? Click Here link in the Sign In box. You will see the New Member Sign Up page. 3. Enter your Jasper Access Code exactly as it appears below. You will not need to use this code after youve completed the sign-up process. If you do not sign up before the expiration date, you must request a new code. · Jasper Access Code: [email or web address] Expires: 12/5/2018 12:03 PM 
 
4. Enter the last four digits of your Social Security Number (xxxx) and Date of Birth (mm/dd/yyyy) as indicated and click Submit. You will be taken to the next sign-up page. 5. Create a OurVinylt ID. This will be your Jasper login ID and cannot be changed, so think of one that is secure and easy to remember. 6. Create a Jasper password. You can change your password at any time. 7. Enter your Password Reset Question and Answer. This can be used at a later time if you forget your password. 8. Enter your e-mail address. You will receive e-mail notification when new information is available in [address] 9. Click Sign Up. You can now view and download portions of your medical record. 10. Click the Download Summary menu link to download a portable copy of your medical information. If you have questions, please visit the Frequently Asked Questions section of the Jasper website. Remember, Jasper is NOT to be used for urgent needs. For medical emergencies, dial 911. Now available from your iPhone and Android! Please provide this summary of care documentation to your next provider. Your primary care clinician is listed as Helga Leigh. If you have any questions after today's visit, please call [phone].

## 2018-09-07 LAB
ALBUMIN SERPL-MCNC: 3.6 G/DL (ref 3.6–4.8)
ALBUMIN/GLOB SERPL: 0.9 {RATIO} (ref 1.2–2.2)
ALP SERPL-CCNC: 65 IU/L (ref 39–117)
ALT SERPL-CCNC: <5 IU/L (ref 0–32)
AST SERPL-CCNC: 12 IU/L (ref 0–40)
BASOPHILS # BLD AUTO: 0.1 X10E3/UL (ref 0–0.2)
BASOPHILS NFR BLD AUTO: 1 %
BILIRUB SERPL-MCNC: 0.4 MG/DL (ref 0–1.2)
BUN SERPL-MCNC: 12 MG/DL (ref 8–27)
BUN/CREAT SERPL: 15 (ref 12–28)
CALCIUM SERPL-MCNC: 8.6 MG/DL (ref 8.7–10.3)
CHLORIDE SERPL-SCNC: 99 MMOL/L (ref 96–106)
CHOLEST SERPL-MCNC: 193 MG/DL (ref 100–199)
CO2 SERPL-SCNC: 25 MMOL/L (ref 20–29)
CREAT SERPL-MCNC: 0.82 MG/DL (ref 0.57–1)
EOSINOPHIL # BLD AUTO: 0.3 X10E3/UL (ref 0–0.4)
EOSINOPHIL NFR BLD AUTO: 5 %
ERYTHROCYTE [DISTWIDTH] IN BLOOD BY AUTOMATED COUNT: 16.3 % (ref 12.3–15.4)
EST. AVERAGE GLUCOSE BLD GHB EST-MCNC: 128 MG/DL
GLOBULIN SER CALC-MCNC: 3.9 G/DL (ref 1.5–4.5)
GLUCOSE SERPL-MCNC: 104 MG/DL (ref 65–99)
HBA1C MFR BLD: 6.1 % (ref 4.8–5.6)
HCT VFR BLD AUTO: 39.3 % (ref 34–46.6)
HDLC SERPL-MCNC: 54 MG/DL
HGB BLD-MCNC: 12.6 G/DL (ref 11.1–15.9)
IMM GRANULOCYTES # BLD: 0 X10E3/UL (ref 0–0.1)
IMM GRANULOCYTES NFR BLD: 0 %
LDLC SERPL CALC-MCNC: 116 MG/DL (ref 0–99)
LYMPHOCYTES # BLD AUTO: 2 X10E3/UL (ref 0.7–3.1)
LYMPHOCYTES NFR BLD AUTO: 33 %
MCH RBC QN AUTO: 25.5 PG (ref 26.6–33)
MCHC RBC AUTO-ENTMCNC: 32.1 G/DL (ref 31.5–35.7)
MCV RBC AUTO: 80 FL (ref 79–97)
MONOCYTES # BLD AUTO: 0.3 X10E3/UL (ref 0.1–0.9)
MONOCYTES NFR BLD AUTO: 5 %
NEUTROPHILS # BLD AUTO: 3.5 X10E3/UL (ref 1.4–7)
NEUTROPHILS NFR BLD AUTO: 56 %
PLATELET # BLD AUTO: 281 X10E3/UL (ref 150–379)
POTASSIUM SERPL-SCNC: 3.5 MMOL/L (ref 3.5–5.2)
PROT SERPL-MCNC: 7.5 G/DL (ref 6–8.5)
RBC # BLD AUTO: 4.94 X10E6/UL (ref 3.77–5.28)
SODIUM SERPL-SCNC: 141 MMOL/L (ref 134–144)
TRIGL SERPL-MCNC: 117 MG/DL (ref 0–149)
VLDLC SERPL CALC-MCNC: 23 MG/DL (ref 5–40)
WBC # BLD AUTO: 6.2 X10E3/UL (ref 3.4–10.8)

## 2018-09-10 NOTE — PROGRESS NOTES
Her blood work looked good.   Everything was fine, and A1c was 6.1, well controlled, so she can continue taking the 1 metformin daily

## 2018-09-11 DIAGNOSIS — Z12.39 BREAST CANCER SCREENING: ICD-10-CM

## 2018-09-11 DIAGNOSIS — Z78.0 POSTMENOPAUSAL: ICD-10-CM

## 2018-09-17 ENCOUNTER — TELEPHONE (OUTPATIENT)
Dept: INTERNAL MEDICINE CLINIC | Age: 68
End: 2018-09-17

## 2018-09-17 NOTE — TELEPHONE ENCOUNTER
----- Message from Jaspreet Crawford MD sent at 9/9/2018  9:04 PM EDT -----  Her blood work looked good.   Everything was fine, and A1c was 6.1, well controlled, so she can continue taking the 1 metformin daily

## 2018-11-06 ENCOUNTER — PATIENT OUTREACH (OUTPATIENT)
Dept: INTERNAL MEDICINE CLINIC | Age: 68
End: 2018-11-06

## 2019-02-12 ENCOUNTER — TELEPHONE (OUTPATIENT)
Dept: INTERNAL MEDICINE CLINIC | Age: 69
End: 2019-02-12

## 2019-02-12 DIAGNOSIS — I10 ESSENTIAL HYPERTENSION: ICD-10-CM

## 2019-02-12 DIAGNOSIS — E78.2 MIXED HYPERLIPIDEMIA: ICD-10-CM

## 2019-02-12 DIAGNOSIS — F41.9 ANXIETY: ICD-10-CM

## 2019-02-12 DIAGNOSIS — E11.9 CONTROLLED TYPE 2 DIABETES MELLITUS WITHOUT COMPLICATION, WITHOUT LONG-TERM CURRENT USE OF INSULIN (HCC): ICD-10-CM

## 2019-02-12 DIAGNOSIS — E55.9 VITAMIN D DEFICIENCY: ICD-10-CM

## 2019-02-12 RX ORDER — PRAVASTATIN SODIUM 20 MG/1
20 TABLET ORAL
Qty: 90 TAB | Refills: 1 | Status: SHIPPED | OUTPATIENT
Start: 2019-02-12 | End: 2019-03-07 | Stop reason: SDUPTHER

## 2019-02-12 RX ORDER — ALPRAZOLAM 0.25 MG/1
0.25 TABLET ORAL
Qty: 30 TAB | Refills: 2 | Status: SHIPPED | OUTPATIENT
Start: 2019-02-12 | End: 2019-03-07 | Stop reason: SDUPTHER

## 2019-02-12 RX ORDER — METOPROLOL SUCCINATE 50 MG/1
50 TABLET, EXTENDED RELEASE ORAL DAILY
Qty: 90 TAB | Refills: 1 | Status: SHIPPED | OUTPATIENT
Start: 2019-02-12 | End: 2019-03-07 | Stop reason: SDUPTHER

## 2019-02-12 RX ORDER — ERGOCALCIFEROL 1.25 MG/1
50000 CAPSULE ORAL
Qty: 12 CAP | Refills: 1 | Status: SHIPPED | OUTPATIENT
Start: 2019-02-12 | End: 2019-03-07 | Stop reason: SDUPTHER

## 2019-02-12 RX ORDER — LOSARTAN POTASSIUM 100 MG/1
100 TABLET ORAL DAILY
Qty: 90 TAB | Refills: 1 | Status: SHIPPED | OUTPATIENT
Start: 2019-02-12 | End: 2019-03-07 | Stop reason: SDUPTHER

## 2019-02-12 RX ORDER — METFORMIN HYDROCHLORIDE 500 MG/1
1000 TABLET, EXTENDED RELEASE ORAL DAILY
Qty: 180 TAB | Refills: 1 | Status: SHIPPED | OUTPATIENT
Start: 2019-02-12 | End: 2020-08-21

## 2019-02-12 RX ORDER — HYDROCHLOROTHIAZIDE 50 MG/1
50 TABLET ORAL DAILY
Qty: 90 TAB | Refills: 1 | Status: SHIPPED | OUTPATIENT
Start: 2019-02-12 | End: 2019-03-07 | Stop reason: SDUPTHER

## 2019-02-12 NOTE — TELEPHONE ENCOUNTER
She came in with her  and asked for med refills because running low.   Will schedule visit to see me for combo clinic next month

## 2019-03-07 ENCOUNTER — OFFICE VISIT (OUTPATIENT)
Dept: INTERNAL MEDICINE CLINIC | Age: 69
End: 2019-03-07

## 2019-03-07 VITALS
HEART RATE: 75 BPM | HEIGHT: 61 IN | WEIGHT: 257 LBS | TEMPERATURE: 98 F | OXYGEN SATURATION: 96 % | BODY MASS INDEX: 48.52 KG/M2 | RESPIRATION RATE: 18 BRPM | DIASTOLIC BLOOD PRESSURE: 112 MMHG | SYSTOLIC BLOOD PRESSURE: 181 MMHG

## 2019-03-07 DIAGNOSIS — E78.2 MIXED HYPERLIPIDEMIA: ICD-10-CM

## 2019-03-07 DIAGNOSIS — Z11.59 NEED FOR HEPATITIS C SCREENING TEST: ICD-10-CM

## 2019-03-07 DIAGNOSIS — Z12.11 COLON CANCER SCREENING: ICD-10-CM

## 2019-03-07 DIAGNOSIS — Z23 ENCOUNTER FOR IMMUNIZATION: ICD-10-CM

## 2019-03-07 DIAGNOSIS — F41.9 ANXIETY: ICD-10-CM

## 2019-03-07 DIAGNOSIS — Z00.00 MEDICARE ANNUAL WELLNESS VISIT, SUBSEQUENT: ICD-10-CM

## 2019-03-07 DIAGNOSIS — E55.9 VITAMIN D DEFICIENCY: ICD-10-CM

## 2019-03-07 DIAGNOSIS — E11.9 CONTROLLED TYPE 2 DIABETES MELLITUS WITHOUT COMPLICATION, WITHOUT LONG-TERM CURRENT USE OF INSULIN (HCC): Primary | ICD-10-CM

## 2019-03-07 DIAGNOSIS — I10 ESSENTIAL HYPERTENSION: ICD-10-CM

## 2019-03-07 LAB
CREAT SERPL-MCNC: 100 MG/DL
MICROALBUMIN UR TEST STR-MCNC: 30 MG/L
MICROALBUMIN/CREAT RATIO POC: NORMAL MG/G

## 2019-03-07 RX ORDER — LOSARTAN POTASSIUM 100 MG/1
100 TABLET ORAL DAILY
Qty: 90 TAB | Refills: 1 | Status: SHIPPED | OUTPATIENT
Start: 2019-03-07 | End: 2020-08-21 | Stop reason: SDUPTHER

## 2019-03-07 RX ORDER — ALPRAZOLAM 0.25 MG/1
0.25 TABLET ORAL
Qty: 30 TAB | Refills: 2 | Status: SHIPPED | OUTPATIENT
Start: 2019-03-07 | End: 2020-08-21

## 2019-03-07 RX ORDER — ERGOCALCIFEROL 1.25 MG/1
50000 CAPSULE ORAL
Qty: 12 CAP | Refills: 1 | Status: SHIPPED | OUTPATIENT
Start: 2019-03-07 | End: 2020-08-21 | Stop reason: SDUPTHER

## 2019-03-07 RX ORDER — PRAVASTATIN SODIUM 20 MG/1
20 TABLET ORAL
Qty: 90 TAB | Refills: 1 | Status: SHIPPED | OUTPATIENT
Start: 2019-03-07 | End: 2020-08-21 | Stop reason: SDUPTHER

## 2019-03-07 RX ORDER — HYDROCHLOROTHIAZIDE 50 MG/1
50 TABLET ORAL DAILY
Qty: 90 TAB | Refills: 1 | Status: SHIPPED | OUTPATIENT
Start: 2019-03-07 | End: 2021-10-06

## 2019-03-07 RX ORDER — METOPROLOL SUCCINATE 50 MG/1
50 TABLET, EXTENDED RELEASE ORAL DAILY
Qty: 90 TAB | Refills: 1 | Status: SHIPPED | OUTPATIENT
Start: 2019-03-07 | End: 2019-04-04 | Stop reason: DRUGHIGH

## 2019-03-07 NOTE — PATIENT INSTRUCTIONS
Measure blood pressure daily:  Goal:  Less than 140/90. Keep a record and bring to me next time. Increase your metoprolol XL 50 mg to 2 tablets daily. Schedule of Personalized Health Plan  (Provide Copy to Patient)  The best way to stay healthy is to live a healthy lifestyle. A healthy lifestyle includes regular exercise, eating a well-balanced diet, keeping a healthy weight and not smoking. Regular physical exams and screening tests are another important way to take care of yourself. Preventive exams provided by health care providers can find health problems early when treatment works best and can keep you from getting certain diseases or illnesses. Preventive services include exams, lab tests, screenings, shots, monitoring and information to help you take care of your own health. All people over 65 should have a pneumonia shot. Pneumonia shots are usually only needed once in a lifetime unless your doctor decides differently. All people over 65 should have a yearly flu shot. People over 65 are at medium to high risk for Hepatitis B. Three shots are needed for complete protection. In addition to your physical exam, some screening tests are recommended:    Bone mass measurement (dexa scan) is recommended every two years  Diabetes Mellitus screening is recommended every year. Glaucoma is an eye disease caused by high pressure in the eye. An eye exam is recommended every year. Cardiovascular screening tests that check your cholesterol and other blood fat (lipid) levels are recommended every five years. Colorectal Cancer screening tests help to find pre-cancerous polyps (growths in the colon) so they can be removed before they turn into cancer. Tests ordered for screening depend on your personal and family history risk factors.     Screening for Breast Cancer is recommended yearly with a mammogram.    Screening for Cervical Cancer is recommended every two years (annually for certain risk factors, such as previous history of STD or abnormal PAP in past 7 years), with a Pelvic Exam with PAP    Here is a list of your current Health Maintenance items with a due date:  Health Maintenance   Topic Date Due    Hepatitis C Screening  1950    FOOT EXAM Q1  02/19/1961    EYE EXAM RETINAL OR DILATED  02/19/1961    Shingrix Vaccine Age 50> (1 of 2) 02/19/2001    BREAST CANCER SCRN MAMMOGRAM  02/19/2001    FOBT Q 1 YEAR AGE 50-75  02/19/2001    GLAUCOMA SCREENING Q2Y  02/19/2016    Bone Densitometry (Dexa) Screening  02/19/2016    Pneumococcal 65+ Low/Medium Risk (1 of 2 - PCV13) 02/19/2016    HEMOGLOBIN A1C Q6M  03/06/2019    MICROALBUMIN Q1  03/06/2019    MEDICARE YEARLY EXAM  03/07/2019    LIPID PANEL Q1  09/06/2019    DTaP/Tdap/Td series (2 - Td) 07/23/2023    Influenza Age 9 to Adult  Completed

## 2019-03-07 NOTE — PROGRESS NOTES
Medicare Wellness Visit  Luis A Demarco is a 77 y.o. female and presents for annual Medicare Wellness Visit. Problem List: Reviewed with patient and discussed risk factors. Patient Active Problem List   Diagnosis Code    Controlled type 2 diabetes mellitus without complication, without long-term current use of insulin (HCC) E11.9    Essential hypertension I10    Vitamin D deficiency E55.9    Mixed hyperlipidemia E78.2    Obesity, morbid ([facility] E66.01    Type 2 diabetes with nephropathy ([facility] E11.21       Current medical providers:  Patient Care Team:  Carson Arroyo MD as PCP - General (Family Practice)    PSH: Reviewed with patient  Past Surgical History:   Procedure Laterality Date    HX GYN          SH: Reviewed with patient  Social History     Tobacco Use    Smoking status: Never Smoker    Smokeless tobacco: Never Used   Substance Use Topics    Alcohol use: No    Drug use: No       FH: Reviewed with patient  Family History   Problem Relation Age of Onset    Heart Disease Mother     Liver Disease Father     Heart Disease Brother        Medications/Allergies: Reviewed with patient  Current Outpatient Medications on File Prior to Visit   Medication Sig Dispense Refill    metFORMIN ER (GLUCOPHAGE XR) 500 mg tablet Take 2 Tabs by mouth daily. 180 Tab 1    fluticasone (FLONASE) 50 mcg/actuation nasal spray 2 Sprays by Both Nostrils route daily. Indications: Allergic Rhinitis 1 Bottle 3    benzonatate (TESSALON) 200 mg capsule Take 1 Cap by mouth three (3) times daily as needed for Cough. 30 Cap 0     No current facility-administered medications on file prior to visit. Allergies   Allergen Reactions    Penicillins Itching       Objective:  Visit Vitals  Pulse 75   Temp 98 °F (36.7 °C) (Oral)   Resp 18   Ht 5' 1\" (1.549 m)   Wt 257 lb (116.6 kg)   SpO2 96%   BMI 48.56 kg/m²    Body mass index is 48.56 kg/m².     Assessment of cognitive impairment: Alert and oriented x 3    Depression Screen:   3 most recent PHQ Screens 3/7/2019   Little interest or pleasure in doing things Not at all   Feeling down, depressed, irritable, or hopeless Not at all   Total Score PHQ 2 0       Fall Risk Assessment:    Fall Risk Assessment, last 12 mths 3/7/2019   Able to walk? Yes   Fall in past 12 months? No   Fall with injury? -       Functional Ability:   Does the patient exhibit a steady gait? No.  Uses walker   How long did it take the patient to get up and walk from a sitting position? 10-20 sec   Is the patient self reliant?  (ie can do own laundry, meals, household chores)  yes     Does the patient handle his/her own medications? yes     Does the patient handle his/her own money? yes     Is the patients home safe (ie good lighting, handrails on stairs and bath, etc.)? yes     Did you notice or did patient express any hearing difficulties? no     Did you notice or did patient express any vision difficulties?   no     Were distance and reading eye charts used? yes       Advance Care Planning:   Patient was offered the opportunity to discuss advance care planning:  yes     Does patient have an Advance Directive:  yes   If no, did you provide information on Caring Connections? yes       Plan:      Orders Placed This Encounter    losartan (COZAAR) 100 mg tablet    metoprolol succinate (TOPROL-XL) 50 mg XL tablet    hydroCHLOROthiazide (HYDRODIURIL) 50 mg tablet    pravastatin (PRAVACHOL) 20 mg tablet    ergocalciferol (ERGOCALCIFEROL) 50,000 unit capsule    ALPRAZolam (XANAX) 0.25 mg tablet     DEXA:  Wants to wait a year. Mammogram:  Will schedule this. Doesn't want an order.   CRC:  Will get Cologuard    Health Maintenance   Topic Date Due    Hepatitis C Screening  1950    FOOT EXAM Q1  02/19/1961    EYE EXAM RETINAL OR DILATED  02/19/1961    Shingrix Vaccine Age 50> (1 of 2) 02/19/2001    BREAST CANCER SCRN MAMMOGRAM  02/19/2001    FOBT Q 1 YEAR AGE 50-75  02/19/2001    GLAUCOMA SCREENING Q2Y  02/19/2016    Bone Densitometry (Dexa) Screening  02/19/2016    Pneumococcal 65+ Low/Medium Risk (1 of 2 - PCV13) 02/19/2016    HEMOGLOBIN A1C Q6M  03/06/2019    MICROALBUMIN Q1  03/06/2019    MEDICARE YEARLY EXAM  03/07/2019    LIPID PANEL Q1  09/06/2019    DTaP/Tdap/Td series (2 - Td) 07/23/2023    Influenza Age 9 to Adult  Completed       *Patient verbalized understanding and agreement with the plan. A copy of the After Visit Summary with personalized health plan was given to the patient today.

## 2019-03-07 NOTE — PROGRESS NOTES
Subjective:   Patient is a 77y.o. year old female who presents for Annual Wellness Visit; Hypertension; and Diabetes  1. DM:  Doesn't check sugars. No symptoms. Taking metformin  mg once daily. Supposed to be on 2 daily. Will check a1c and decide if she should be on 1 or 2.    2.  HTN:  BP very elevated today but was upset. Took her meds today. Checking sugars at home. Seems to be good at home. But BP goes up when gets anxious or upset. No symptoms. No CP    3. Hyerlipidemia:  On Pravachol daily. No symptoms, side effects. 4.  Anxiety:  Takes PRN Xanax. Working well. Needs refill. 5.  Asks for a script for a chair lift for the stairs at their new place. So I wrote out one. Review of Systems   Constitutional: Negative. Cardiovascular: Negative. Current Outpatient Medications on File Prior to Visit   Medication Sig Dispense Refill    metFORMIN ER (GLUCOPHAGE XR) 500 mg tablet Take 2 Tabs by mouth daily. 180 Tab 1    fluticasone (FLONASE) 50 mcg/actuation nasal spray 2 Sprays by Both Nostrils route daily. Indications: Allergic Rhinitis 1 Bottle 3    benzonatate (TESSALON) 200 mg capsule Take 1 Cap by mouth three (3) times daily as needed for Cough. 30 Cap 0     No current facility-administered medications on file prior to visit. Reviewed PmHx, RxHx, FmHx, SocHx, AllgHx and updated and dated in the chart. Nurse notes were reviewed and are correct    Objective:     Vitals:    03/07/19 0931 03/07/19 1003   BP: (!) 192/114 (!) 181/112   Pulse: 75    Resp: 18    Temp: 98 °F (36.7 °C)    TempSrc: Oral    SpO2: 96%    Weight: 257 lb (116.6 kg)    Height: 5' 1\" (1.549 m)      Physical Exam   Constitutional: She is oriented to person, place, and time. She appears well-developed and well-nourished. No distress. HENT:   Head: Normocephalic and atraumatic.    Right Ear: External ear normal.   Left Ear: External ear normal.   Nose: Nose normal.   Mouth/Throat: Oropharynx is clear and moist.   Eyes: EOM are normal. Pupils are equal, round, and reactive to light. Neck: Normal range of motion. Neck supple. Carotid bruit is not present. No tracheal deviation present. Cardiovascular: Normal rate, regular rhythm, normal heart sounds and intact distal pulses. Exam reveals no gallop and no friction rub. No murmur heard. Pulmonary/Chest: Effort normal and breath sounds normal. She has no wheezes. She has no rales. Abdominal: Soft. Bowel sounds are normal. She exhibits no distension. There is no tenderness. Musculoskeletal: She exhibits no edema or tenderness. Lymphadenopathy:     She has no cervical adenopathy. Neurological: She is alert and oriented to person, place, and time. Skin: Skin is warm and dry. Psychiatric: She has a normal mood and affect. Her behavior is normal.   Nursing note and vitals reviewed. Diabetic foot exam:     Left: Reflexes 2+     Filament test 6/6   Pulse DP: 2+ (normal)   Pulse PT: 2+ (normal)   Deformities: dry skin    Right: Reflexes 2+   Filament test 6/6   Pulse DP: 2+ (normal)   Pulse PT: 2+ (normal)   Deformities: dry skin        Assessment/ Plan:     Diagnoses and all orders for this visit:    1. Controlled type 2 diabetes mellitus without complication, without long-term current use of insulin (HCC)  -     HEMOGLOBIN A1C WITH EAG; Future  -     AMB POC URINE, MICROALBUMIN, SEMIQUANTITATIVE  -     URINALYSIS W/ RFLX MICROSCOPIC; Future  -     TSH 3RD GENERATION; Future  -      DIABETES FOOT EXAM    2. Essential hypertension:  Uncontrolled. She will take metoprolol 50 mg BID instead of every day and check BP regularly. Recheck 1 month, sooner if BP staying too high at home. -     losartan (COZAAR) 100 mg tablet; Take 1 Tab by mouth daily. -     metoprolol succinate (TOPROL-XL) 50 mg XL tablet; Take 1 Tab by mouth daily. -     hydroCHLOROthiazide (HYDRODIURIL) 50 mg tablet;  Take 1 Tab by mouth daily.  -     CBC WITH AUTOMATED DIFF; Future  -     METABOLIC PANEL, COMPREHENSIVE; Future  -     URINALYSIS W/ RFLX MICROSCOPIC; Future    3. Mixed hyperlipidemia  -     pravastatin (PRAVACHOL) 20 mg tablet; Take 1 Tab by mouth nightly. -     LIPID PANEL; Future  -     TSH 3RD GENERATION; Future    4. Vitamin D deficiency  -     ergocalciferol (ERGOCALCIFEROL) 50,000 unit capsule; Take 1 Cap by mouth every seven (7) days. -     VITAMIN D, 25 HYDROXY; Future    5. Anxiety  -     ALPRAZolam (XANAX) 0.25 mg tablet; Take 1 Tab by mouth three (3) times daily as needed for Anxiety. Max Daily Amount: 0.75 mg.    6. Need for hepatitis C screening test  -     HEPATITIS C AB; Future    7. Colon cancer screening  -     COLOGUARD TEST (FECAL DNA COLORECTAL CANCER SCREENING)    8. Encounter for immunization  -     pneumococcal 13 bryan conj dip (PREVNAR-13) 0.5 mL syrg injection; 0.5 mL by IntraMUSCular route once for 1 dose. I have discussed the diagnosis with the patient and the intended plan as seen in the above orders. The patient verbalized understanding and agrees with the plan. Follow-up Disposition:  Return in about 1 month (around 4/7/2019) for HTN.     Caitlin Garnica MD

## 2019-03-07 NOTE — PROGRESS NOTES
Chief Complaint   Patient presents with    Annual Wellness Visit    Hypertension    Diabetes   1. Have you been to the ER, urgent care clinic since your last visit? Hospitalized since your last visit? No    2. Have you seen or consulted any other health care providers outside of the [address] [place] [address] [place] since your last visit? Include any pap smears or colon screening.  No     ADL Assessment 3/7/2019   Feeding yourself No Help Needed   Getting from bed to chair Help Needed   Getting dressed No Help Needed   Bathing or showering Help Needed   Walk across the room (includes cane/walker) Help Needed   Using the telphone No Help Needed   Taking your medications No Help Needed   Preparing meals No Help Needed   Managing money (expenses/bills) No Help Needed   Moderately strenuous housework (laundry) Help Needed   Shopping for personal items (toiletries/medicines) Help Needed   Shopping for groceries Help Needed   Driving No Help Needed   Climbing a flight of stairs Help Needed   Getting to places beyond walking distances Help Needed

## 2019-03-08 NOTE — ACP (ADVANCE CARE PLANNING)
Advance Care Planning (ACP) Provider Conversation Snapshot    Persons included in Conversation:  patient  Length of ACP Conversation in minutes:  <16 minutes (Non-Billable)    Authorized Decision Maker (if patient is incapable of making informed decisions):    This person is:   Patient capable of making healthcare decisions          For Patients with Decision Making Capacity:   Values/Goals: Exploration of values, goals, and preferences if recovery is not expected, even with continued medical treatment in the event of:  Imminent death, severe brain injury    Conversation Outcomes / Follow-Up Plan:   Recommended completion of Advance Directive form after review of ACP materials and conversation with prospective healthcare agent

## 2019-03-12 LAB
25(OH)D3+25(OH)D2 SERPL-MCNC: 24.5 NG/ML (ref 30–100)
ALBUMIN SERPL-MCNC: 3.6 G/DL (ref 3.6–4.8)
ALBUMIN/GLOB SERPL: 0.9 {RATIO} (ref 1.2–2.2)
ALP SERPL-CCNC: 63 IU/L (ref 39–117)
ALT SERPL-CCNC: 8 IU/L (ref 0–32)
APPEARANCE UR: CLEAR
AST SERPL-CCNC: 12 IU/L (ref 0–40)
BASOPHILS # BLD AUTO: 0 X10E3/UL (ref 0–0.2)
BASOPHILS NFR BLD AUTO: 0 %
BILIRUB SERPL-MCNC: 0.3 MG/DL (ref 0–1.2)
BILIRUB UR QL STRIP: NEGATIVE
BUN SERPL-MCNC: 15 MG/DL (ref 8–27)
BUN/CREAT SERPL: 14 (ref 12–28)
CALCIUM SERPL-MCNC: 9.4 MG/DL (ref 8.7–10.3)
CHLORIDE SERPL-SCNC: 96 MMOL/L (ref 96–106)
CHOLEST SERPL-MCNC: 172 MG/DL (ref 100–199)
CO2 SERPL-SCNC: 27 MMOL/L (ref 20–29)
COLOR UR: YELLOW
CREAT SERPL-MCNC: 1.06 MG/DL (ref 0.57–1)
EOSINOPHIL # BLD AUTO: 0.4 X10E3/UL (ref 0–0.4)
EOSINOPHIL NFR BLD AUTO: 5 %
ERYTHROCYTE [DISTWIDTH] IN BLOOD BY AUTOMATED COUNT: 16.3 % (ref 12.3–15.4)
GLOBULIN SER CALC-MCNC: 4 G/DL (ref 1.5–4.5)
GLUCOSE SERPL-MCNC: 102 MG/DL (ref 65–99)
GLUCOSE UR QL: NEGATIVE
HBA1C MFR BLD: NORMAL %
HCT VFR BLD AUTO: 39.2 % (ref 34–46.6)
HCV AB S/CO SERPL IA: <0.1 S/CO RATIO (ref 0–0.9)
HDLC SERPL-MCNC: 52 MG/DL
HGB BLD-MCNC: 12.9 G/DL (ref 11.1–15.9)
HGB UR QL STRIP: NEGATIVE
IMM GRANULOCYTES # BLD AUTO: 0 X10E3/UL (ref 0–0.1)
IMM GRANULOCYTES NFR BLD AUTO: 0 %
KETONES UR QL STRIP: NEGATIVE
LDLC SERPL CALC-MCNC: 92 MG/DL (ref 0–99)
LEUKOCYTE ESTERASE UR QL STRIP: NEGATIVE
LYMPHOCYTES # BLD AUTO: 2 X10E3/UL (ref 0.7–3.1)
LYMPHOCYTES NFR BLD AUTO: 27 %
MCH RBC QN AUTO: 26.1 PG (ref 26.6–33)
MCHC RBC AUTO-ENTMCNC: 32.9 G/DL (ref 31.5–35.7)
MCV RBC AUTO: 79 FL (ref 79–97)
MICRO URNS: NORMAL
MONOCYTES # BLD AUTO: 0.3 X10E3/UL (ref 0.1–0.9)
MONOCYTES NFR BLD AUTO: 5 %
MORPHOLOGY BLD-IMP: ABNORMAL
NEUTROPHILS # BLD AUTO: 4.5 X10E3/UL (ref 1.4–7)
NEUTROPHILS NFR BLD AUTO: 63 %
NITRITE UR QL STRIP: NEGATIVE
PH UR STRIP: 7.5 [PH] (ref 5–7.5)
PLATELET # BLD AUTO: 255 X10E3/UL (ref 150–379)
POTASSIUM SERPL-SCNC: 3.7 MMOL/L (ref 3.5–5.2)
PROT SERPL-MCNC: 7.6 G/DL (ref 6–8.5)
PROT UR QL STRIP: NEGATIVE
RBC # BLD AUTO: 4.94 X10E6/UL (ref 3.77–5.28)
SODIUM SERPL-SCNC: 140 MMOL/L (ref 134–144)
SP GR UR: 1.01 (ref 1–1.03)
TRIGL SERPL-MCNC: 139 MG/DL (ref 0–149)
TSH SERPL DL<=0.005 MIU/L-ACNC: 2.12 UIU/ML (ref 0.45–4.5)
UROBILINOGEN UR STRIP-MCNC: 0.2 MG/DL (ref 0.2–1)
VLDLC SERPL CALC-MCNC: 28 MG/DL (ref 5–40)
WBC # BLD AUTO: 7.2 X10E3/UL (ref 3.4–10.8)

## 2019-03-13 NOTE — PROGRESS NOTES
Let her know her A1c was canceled because of insufficient blood so she can get fingerstick A1c when she comes back next month. Her kidney function was slightly decreased so she needs to avoid all NSAIDs  And drink plenty of water. Vit D is mildly low. She needs to continue the weekly Vit D. Everything else looks good.

## 2019-04-04 ENCOUNTER — OFFICE VISIT (OUTPATIENT)
Dept: INTERNAL MEDICINE CLINIC | Age: 69
End: 2019-04-04

## 2019-04-04 VITALS
DIASTOLIC BLOOD PRESSURE: 123 MMHG | OXYGEN SATURATION: 96 % | HEIGHT: 61 IN | SYSTOLIC BLOOD PRESSURE: 195 MMHG | HEART RATE: 79 BPM | TEMPERATURE: 97.9 F | BODY MASS INDEX: 49.09 KG/M2 | WEIGHT: 260 LBS | RESPIRATION RATE: 18 BRPM

## 2019-04-04 DIAGNOSIS — E11.9 CONTROLLED TYPE 2 DIABETES MELLITUS WITHOUT COMPLICATION, WITHOUT LONG-TERM CURRENT USE OF INSULIN (HCC): Primary | ICD-10-CM

## 2019-04-04 DIAGNOSIS — I10 ESSENTIAL HYPERTENSION: ICD-10-CM

## 2019-04-04 DIAGNOSIS — F41.9 ANXIETY: ICD-10-CM

## 2019-04-04 LAB — HBA1C MFR BLD HPLC: 5.9 %

## 2019-04-04 RX ORDER — METOPROLOL SUCCINATE 100 MG/1
100 TABLET, EXTENDED RELEASE ORAL DAILY
Qty: 90 TAB | Refills: 1 | Status: SHIPPED | OUTPATIENT
Start: 2019-04-04 | End: 2020-08-21 | Stop reason: SDUPTHER

## 2019-04-04 RX ORDER — AMLODIPINE BESYLATE 5 MG/1
5 TABLET ORAL DAILY
Qty: 90 TAB | Refills: 1 | Status: SHIPPED | OUTPATIENT
Start: 2019-04-04 | End: 2021-10-06

## 2019-04-04 NOTE — PROGRESS NOTES
Subjective:  
Patient is a 77y.o. year old female who presents for Blood Pressure Check and Diabetes (Finger stick A1C) 1. DM:  On metformin XR for this, 1000 mg daily. Not having any symptoms. A1c today 5.9 2. HTN:  BP has been very high. Was very high last time:  181/112. We increased her metoprolol. Her BP is still very high today. She says she's been taking her medicine. BP at home:  189/102. BP goes up when upset. See below. Says she's been taking the metoprolol BID (total 100 mg). 3.  Anxiety:  On prn Xanax. She is under a lot of stress. The woman who was taking care of her  is suing her. She brought her  home because the woman wasn't taking good care of her . So now the woman says they owe her money. Was taking 2 Xanax at a time but was getting dizzy so now using 1 1/2 and that's working well. Review of Systems Constitutional: Negative. Cardiovascular: Negative. Current Outpatient Medications on File Prior to Visit Medication Sig Dispense Refill  losartan (COZAAR) 100 mg tablet Take 1 Tab by mouth daily. 90 Tab 1  
 hydroCHLOROthiazide (HYDRODIURIL) 50 mg tablet Take 1 Tab by mouth daily. 90 Tab 1  pravastatin (PRAVACHOL) 20 mg tablet Take 1 Tab by mouth nightly. 90 Tab 1  
 ergocalciferol (ERGOCALCIFEROL) 50,000 unit capsule Take 1 Cap by mouth every seven (7) days. 12 Cap 1  
 ALPRAZolam (XANAX) 0.25 mg tablet Take 1 Tab by mouth three (3) times daily as needed for Anxiety. Max Daily Amount: 0.75 mg. 30 Tab 2  
 metFORMIN ER (GLUCOPHAGE XR) 500 mg tablet Take 2 Tabs by mouth daily. 180 Tab 1  
 fluticasone (FLONASE) 50 mcg/actuation nasal spray 2 Sprays by Both Nostrils route daily. Indications: Allergic Rhinitis 1 Bottle 3  
 benzonatate (TESSALON) 200 mg capsule Take 1 Cap by mouth three (3) times daily as needed for Cough. 30 Cap 0 No current facility-administered medications on file prior to visit. Reviewed PmHx, RxHx, FmHx, SocHx, AllgHx and updated and dated in the chart. Nurse notes were reviewed and are correct Objective:  
 
Vitals:  
 04/04/19 0932 04/04/19 1117 BP: (!) 208/107 (!) 195/123 Pulse: 79 Resp: 18 Temp: 97.9 °F (36.6 °C) TempSrc: Oral   
SpO2: 96% Weight: 260 lb (117.9 kg) Height: 5' 1\" (1.549 m) Physical Exam  
Constitutional: She is oriented to person, place, and time. She appears well-developed and well-nourished. No distress. HENT:  
Head: Normocephalic and atraumatic. Right Ear: External ear normal.  
Left Ear: External ear normal.  
Nose: Nose normal.  
Mouth/Throat: Oropharynx is clear and moist.  
Eyes: Pupils are equal, round, and reactive to light. EOM are normal.  
Neck: Normal range of motion. Neck supple. Carotid bruit is not present. No tracheal deviation present. Cardiovascular: Normal rate, regular rhythm, normal heart sounds and intact distal pulses. Exam reveals no gallop and no friction rub. No murmur heard. Pulmonary/Chest: Effort normal and breath sounds normal. She has no wheezes. She has no rales. Abdominal: Soft. Bowel sounds are normal. She exhibits no distension. There is no tenderness. Musculoskeletal: She exhibits no edema or tenderness. Lymphadenopathy:  
  She has no cervical adenopathy. Neurological: She is alert and oriented to person, place, and time. Skin: Skin is warm and dry. Psychiatric: She has a normal mood and affect. Her behavior is normal.  
Nursing note and vitals reviewed. Assessment/ Plan:  
 
Diagnoses and all orders for this visit: 1. Controlled type 2 diabetes mellitus without complication, without long-term current use of insulin ([address]):  A1c 5.9. Continue current dose metformin -     AMB POC HEMOGLOBIN A1C 
 
2. Essential hypertension:  Seems to be stress-related but way too high so adding amlodipine. Switching metoprolol to 100 every day instead of 2 50s. Continue other meds. -     metoprolol succinate (TOPROL-XL) 100 mg tablet; Take 1 Tab by mouth daily. -     amLODIPine (NORVASC) 5 mg tablet; Take 1 Tab by mouth daily. 3. Anxiety:  Under a lot of stress. Continue Xanax I have discussed the diagnosis with the patient and the intended plan as seen in the above orders. The patient verbalized understanding and agrees with the plan. Follow-up and Dispositions · Return in about 1 month (around 5/4/2019) for HTN recheck.  
  
 
 
Ivan Harman MD

## 2019-04-04 NOTE — PROGRESS NOTES
Chief Complaint Patient presents with  Blood Pressure Check  Diabetes Finger stick A1C  
1. Have you been to the ER, urgent care clinic since your last visit? Hospitalized since your last visit? No 
 
2. Have you seen or consulted any other health care providers outside of the [address] [place] [address] since your last visit? Include any pap smears or colon screening.  No

## 2020-08-21 ENCOUNTER — TELEPHONE (OUTPATIENT)
Dept: FAMILY MEDICINE CLINIC | Age: 70
End: 2020-08-21

## 2020-08-21 ENCOUNTER — VIRTUAL VISIT (OUTPATIENT)
Dept: FAMILY MEDICINE CLINIC | Age: 70
End: 2020-08-21

## 2020-08-21 DIAGNOSIS — E78.2 MIXED HYPERLIPIDEMIA: ICD-10-CM

## 2020-08-21 DIAGNOSIS — F41.9 ANXIETY: ICD-10-CM

## 2020-08-21 DIAGNOSIS — E11.21 TYPE 2 DIABETES WITH NEPHROPATHY (HCC): ICD-10-CM

## 2020-08-21 DIAGNOSIS — E66.01 OBESITY, MORBID (HCC): ICD-10-CM

## 2020-08-21 DIAGNOSIS — J30.9 CHRONIC ALLERGIC RHINITIS: ICD-10-CM

## 2020-08-21 DIAGNOSIS — I10 ESSENTIAL HYPERTENSION: Primary | ICD-10-CM

## 2020-08-21 DIAGNOSIS — E55.9 VITAMIN D DEFICIENCY: ICD-10-CM

## 2020-08-21 RX ORDER — LOSARTAN POTASSIUM 100 MG/1
100 TABLET ORAL DAILY
Qty: 90 TAB | Refills: 0 | Status: SHIPPED | OUTPATIENT
Start: 2020-08-21 | End: 2020-12-07

## 2020-08-21 RX ORDER — PRAVASTATIN SODIUM 20 MG/1
20 TABLET ORAL
Qty: 90 TAB | Refills: 0 | Status: SHIPPED | OUTPATIENT
Start: 2020-08-21 | End: 2020-12-07

## 2020-08-21 RX ORDER — METFORMIN HYDROCHLORIDE 500 MG/1
500 TABLET ORAL 2 TIMES DAILY WITH MEALS
Qty: 180 TAB | Refills: 0 | Status: SHIPPED | OUTPATIENT
Start: 2020-08-21 | End: 2020-12-07

## 2020-08-21 RX ORDER — ERGOCALCIFEROL 1.25 MG/1
50000 CAPSULE ORAL
Qty: 12 CAP | Refills: 1 | Status: SHIPPED | OUTPATIENT
Start: 2020-08-21 | End: 2021-08-18 | Stop reason: SDUPTHER

## 2020-08-21 RX ORDER — BUSPIRONE HYDROCHLORIDE 5 MG/1
5 TABLET ORAL
Qty: 90 TAB | Refills: 0 | Status: SHIPPED | OUTPATIENT
Start: 2020-08-21 | End: 2021-09-16 | Stop reason: SDUPTHER

## 2020-08-21 RX ORDER — FLUTICASONE PROPIONATE 50 MCG
2 SPRAY, SUSPENSION (ML) NASAL DAILY
Qty: 1 BOTTLE | Refills: 3 | Status: SHIPPED | OUTPATIENT
Start: 2020-08-21

## 2020-08-21 RX ORDER — METOPROLOL SUCCINATE 100 MG/1
100 TABLET, EXTENDED RELEASE ORAL DAILY
Qty: 90 TAB | Refills: 0 | Status: SHIPPED | OUTPATIENT
Start: 2020-08-21 | End: 2021-08-18 | Stop reason: SDUPTHER

## 2020-08-21 NOTE — PROGRESS NOTES
Bruce Drummond is a 72 y.o. female who was seen by synchronous (real-time) audio-video technology on 8/21/2020 for Establish Care; Medication Evaluation; and Follow Up Chronic Condition    Patient is here to establish care and to get medication refill ,last time she had refill was 4/19 , he  was sick and he passed away 3 month ago ,she was not taking care of herself and prior provider has left the and was trying to find a new PCP    She is due for medicare wellness to be scheduled VV    Assessment & Plan:   Diagnoses and all orders for this visit:    1. Essential hypertension  -     metoprolol succinate (TOPROL-XL) 100 mg tablet; Take 1 Tab by mouth daily. -     losartan (COZAAR) 100 mg tablet; Take 1 Tab by mouth daily.  -     METABOLIC PANEL, COMPREHENSIVE; Future  -     CBC WITH AUTOMATED DIFF; Future  -     LIPID PANEL; Future    2. Anxiety    To stop Xanax and start Buspar   -     busPIRone (BUSPAR) 5 mg tablet; Take 1 Tab by mouth three (3) times daily (with meals). 3. Mixed hyperlipidemia  -     pravastatin (PRAVACHOL) 20 mg tablet; Take 1 Tab by mouth nightly. -     CBC WITH AUTOMATED DIFF; Future  -     LIPID PANEL; Future    4. Vitamin D deficiency  -     ergocalciferol (ERGOCALCIFEROL) 1,250 mcg (50,000 unit) capsule; Take 1 Cap by mouth every seven (7) days. -     VITAMIN D, 25 HYDROXY; Future    5. Chronic allergic rhinitis  -     fluticasone propionate (Flonase) 50 mcg/actuation nasal spray; 2 Sprays by Both Nostrils route daily. Indications: inflammation of the nose due to an allergy    6. Type 2 diabetes with nephropathy (HCC)  -     metFORMIN (GLUCOPHAGE) 500 mg tablet; Take 1 Tab by mouth two (2) times daily (with meals) for 90 days.  -     METABOLIC PANEL, COMPREHENSIVE; Future  -     CBC WITH AUTOMATED DIFF; Future  -     LIPID PANEL; Future  -     HEMOGLOBIN A1C W/O EAG; Future    7.  Obesity, morbid ([facility] [address]         712  Subjective:       Prior to Admission medications Medication Sig Start Date End Date Taking? Authorizing Provider   metFORMIN (GLUCOPHAGE) 500 mg tablet Take 1 Tab by mouth two (2) times daily (with meals) for 90 days. 8/21/20 11/19/20 Yes Birdget Mcguire MD   metoprolol succinate (TOPROL-XL) 100 mg tablet Take 1 Tab by mouth daily. 8/21/20  Yes Bridget Mcguire MD   pravastatin (PRAVACHOL) 20 mg tablet Take 1 Tab by mouth nightly. 8/21/20  Yes Bridget Mcguire MD   losartan (COZAAR) 100 mg tablet Take 1 Tab by mouth daily. 8/21/20  Yes Bridget Mcguire MD   ergocalciferol (ERGOCALCIFEROL) 1,250 mcg (50,000 unit) capsule Take 1 Cap by mouth every seven (7) days. 8/21/20  Yes Bridget Mcguire MD   fluticasone propionate (Flonase) 50 mcg/actuation nasal spray 2 Sprays by Both Nostrils route daily. Indications: inflammation of the nose due to an allergy 8/21/20  Yes Bridget Mcguire MD   busPIRone (BUSPAR) 5 mg tablet Take 1 Tab by mouth three (3) times daily (with meals). 8/21/20  Yes Bridget Mcguire MD   amLODIPine (NORVASC) 5 mg tablet Take 1 Tab by mouth daily. 4/4/19  Yes Anil Peterson MD   hydroCHLOROthiazide (HYDRODIURIL) 50 mg tablet Take 1 Tab by mouth daily. 3/7/19  Yes Anil Peterson MD   ALPRAZolam Dot Ramoss) 0.25 mg tablet Take 1 Tab by mouth three (3) times daily as needed for Anxiety. Max Daily Amount: 0.75 mg. 3/7/19  Yes Anil Peterson MD   metoprolol succinate (TOPROL-XL) 100 mg tablet Take 1 Tab by mouth daily. 4/4/19 8/21/20  Anil Peterson MD   losartan (COZAAR) 100 mg tablet Take 1 Tab by mouth daily. 3/7/19 8/21/20  Morris Farrell III, MD   pravastatin (PRAVACHOL) 20 mg tablet Take 1 Tab by mouth nightly. 3/7/19 8/21/20  Anil Peterson MD   ergocalciferol (ERGOCALCIFEROL) 50,000 unit capsule Take 1 Cap by mouth every seven (7) days. 3/7/19 8/21/20  Morris Farrell III, MD   metFORMIN ER (GLUCOPHAGE XR) 500 mg tablet Take 2 Tabs by mouth daily.  2/12/19 8/21/20  Morris Farrell III, MD   fluticasone (FLONASE) 50 mcg/actuation nasal spray 2 Sprays by Both Nostrils route daily. Indications: Allergic Rhinitis 12/11/17 8/21/20  Jordan Alex III, MD   benzonatate (TESSALON) 200 mg capsule Take 1 Cap by mouth three (3) times daily as needed for Cough. 12/11/17 8/21/20  Rosita Randhawa MD     Patient Active Problem List   Diagnosis Code    Controlled type 2 diabetes mellitus without complication, without long-term current use of insulin ([address] E11.9    Essential hypertension I10    Vitamin D deficiency E55.9    Mixed hyperlipidemia E78.2    Obesity, morbid ([address] E66.01    Type 2 diabetes with nephropathy ([address] E11.21    Anxiety F41.9     Patient Active Problem List    Diagnosis Date Noted    Anxiety 03/07/2019    Type 2 diabetes with nephropathy ([address] 03/06/2018    Obesity, morbid ([address] 12/11/2017    Mixed hyperlipidemia 06/23/2017    Controlled type 2 diabetes mellitus without complication, without long-term current use of insulin ([address] 06/19/2017    Essential hypertension 06/19/2017    Vitamin D deficiency 06/19/2017     Current Outpatient Medications   Medication Sig Dispense Refill    metFORMIN (GLUCOPHAGE) 500 mg tablet Take 1 Tab by mouth two (2) times daily (with meals) for 90 days. 180 Tab 0    metoprolol succinate (TOPROL-XL) 100 mg tablet Take 1 Tab by mouth daily. 90 Tab 0    pravastatin (PRAVACHOL) 20 mg tablet Take 1 Tab by mouth nightly. 90 Tab 0    losartan (COZAAR) 100 mg tablet Take 1 Tab by mouth daily. 90 Tab 0    ergocalciferol (ERGOCALCIFEROL) 1,250 mcg (50,000 unit) capsule Take 1 Cap by mouth every seven (7) days. 12 Cap 1    fluticasone propionate (Flonase) 50 mcg/actuation nasal spray 2 Sprays by Both Nostrils route daily. Indications: inflammation of the nose due to an allergy 1 Bottle 3    busPIRone (BUSPAR) 5 mg tablet Take 1 Tab by mouth three (3) times daily (with meals).  90 Tab 0    amLODIPine (NORVASC) 5 mg tablet Take 1 Tab by mouth daily. 90 Tab 1    hydroCHLOROthiazide (HYDRODIURIL) 50 mg tablet Take 1 Tab by mouth daily. 90 Tab 1     Allergies   Allergen Reactions    Penicillins Itching     Past Medical History:   Diagnosis Date    Anxiety     Arthritis     Diabetes ([address]     Hypercholesterolemia     Hypertension      Past Surgical History:   Procedure Laterality Date    HX GYN       Family History   Problem Relation Age of Onset    Heart Disease Mother     Liver Disease Father     Heart Disease Brother      Social History     Tobacco Use    Smoking status: Never Smoker    Smokeless tobacco: Never Used   Substance Use Topics    Alcohol use: No       Review of Systems   Constitutional: Negative for chills, fever, malaise/fatigue and weight loss. HENT: Negative for congestion, ear discharge, ear pain, hearing loss and nosebleeds. Eyes: Negative for blurred vision, double vision and discharge. Respiratory: Negative for cough, hemoptysis, sputum production, shortness of breath and wheezing. Cardiovascular: Negative for chest pain, palpitations, claudication and leg swelling. Gastrointestinal: Negative for abdominal pain, constipation, diarrhea, nausea and vomiting. Genitourinary: Negative for dysuria, frequency and urgency. Musculoskeletal: Negative for myalgias. Skin: Negative for itching and rash. Neurological: Negative for dizziness, tingling, sensory change, speech change, focal weakness, weakness and headaches. Psychiatric/Behavioral: Negative for depression, hallucinations, substance abuse and suicidal ideas. The patient is nervous/anxious. Objective:   No flowsheet data found.    General: alert, cooperative, no distress   Mental  status: normal mood, behavior, speech, dress, motor activity, and thought processes, able to follow commands   HENT: NCAT   Neck: no visualized mass   Resp: no respiratory distress   Neuro: no gross deficits   Skin: no discoloration or lesions of concern on visible areas   Psychiatric: normal affect, consistent with stated mood, no evidence of hallucinations     Additional exam findings: We discussed the expected course, resolution and complications of the diagnosis(es) in detail. Medication risks, benefits, costs, interactions, and alternatives were discussed as indicated. I advised her to contact the office if her condition worsens, changes or fails to improve as anticipated. She expressed understanding with the diagnosis(es) and plan. Nida Cotton, who was evaluated through a patient-initiated, synchronous (real-time) audio-video encounter, and/or her healthcare decision maker, is aware that it is a billable service, with coverage as determined by her insurance carrier. She provided verbal consent to proceed: Yes, and patient identification was verified. It was conducted pursuant to the emergency declaration under the [address] [place] [address], [address] [place] [address] authority and the [employer] General Act. A caregiver was present when appropriate. Ability to conduct physical exam was limited. I was in the office. The patient was at home.       Valentina Subramanian MD

## 2020-08-22 NOTE — TELEPHONE ENCOUNTER
Please schedule patient for lab appointment and nurse visit ,she need pneumonia vaccine as well     She also need to schedule medicare wellness can be VV

## 2020-08-27 ENCOUNTER — CLINICAL SUPPORT (OUTPATIENT)
Dept: FAMILY MEDICINE CLINIC | Age: 70
End: 2020-08-27

## 2020-08-27 DIAGNOSIS — Z23 ENCOUNTER FOR IMMUNIZATION: ICD-10-CM

## 2020-08-27 NOTE — PROGRESS NOTES
Pneumoxax 23 Immunization/s administered 8/27/2020 by Darvin Jaramillo with patient's consent. Patient tolerated procedure well. No reactions noted.

## 2020-08-28 LAB
25(OH)D3+25(OH)D2 SERPL-MCNC: 22 NG/ML (ref 30–100)
ALBUMIN SERPL-MCNC: 3.7 G/DL (ref 3.8–4.8)
ALBUMIN/GLOB SERPL: 1.1 {RATIO} (ref 1.2–2.2)
ALP SERPL-CCNC: 66 IU/L (ref 39–117)
ALT SERPL-CCNC: 7 IU/L (ref 0–32)
AST SERPL-CCNC: 10 IU/L (ref 0–40)
BASOPHILS # BLD AUTO: 0 X10E3/UL (ref 0–0.2)
BASOPHILS NFR BLD AUTO: 1 %
BILIRUB SERPL-MCNC: 0.4 MG/DL (ref 0–1.2)
BUN SERPL-MCNC: 12 MG/DL (ref 8–27)
BUN/CREAT SERPL: 13 (ref 12–28)
CALCIUM SERPL-MCNC: 9 MG/DL (ref 8.7–10.3)
CHLORIDE SERPL-SCNC: 99 MMOL/L (ref 96–106)
CHOLEST SERPL-MCNC: 174 MG/DL (ref 100–199)
CO2 SERPL-SCNC: 27 MMOL/L (ref 20–29)
CREAT SERPL-MCNC: 0.89 MG/DL (ref 0.57–1)
CREAT UR-MCNC: NORMAL MG/DL
EOSINOPHIL # BLD AUTO: 0.4 X10E3/UL (ref 0–0.4)
EOSINOPHIL NFR BLD AUTO: 5 %
ERYTHROCYTE [DISTWIDTH] IN BLOOD BY AUTOMATED COUNT: 15.7 % (ref 11.7–15.4)
GLOBULIN SER CALC-MCNC: 3.3 G/DL (ref 1.5–4.5)
GLUCOSE SERPL-MCNC: 93 MG/DL (ref 65–99)
HBA1C MFR BLD: 6.4 % (ref 4.8–5.6)
HCT VFR BLD AUTO: 40.7 % (ref 34–46.6)
HDLC SERPL-MCNC: 51 MG/DL
HGB BLD-MCNC: 12.6 G/DL (ref 11.1–15.9)
IMM GRANULOCYTES # BLD AUTO: 0.1 X10E3/UL (ref 0–0.1)
IMM GRANULOCYTES NFR BLD AUTO: 1 %
INTERPRETATION, 910389: NORMAL
LDLC SERPL CALC-MCNC: 91 MG/DL (ref 0–99)
LYMPHOCYTES # BLD AUTO: 2.1 X10E3/UL (ref 0.7–3.1)
LYMPHOCYTES NFR BLD AUTO: 31 %
MCH RBC QN AUTO: 25.1 PG (ref 26.6–33)
MCHC RBC AUTO-ENTMCNC: 31 G/DL (ref 31.5–35.7)
MCV RBC AUTO: 81 FL (ref 79–97)
MICROALBUMIN UR-MCNC: NORMAL
MONOCYTES # BLD AUTO: 0.5 X10E3/UL (ref 0.1–0.9)
MONOCYTES NFR BLD AUTO: 7 %
NEUTROPHILS # BLD AUTO: 3.8 X10E3/UL (ref 1.4–7)
NEUTROPHILS NFR BLD AUTO: 55 %
PLATELET # BLD AUTO: 276 X10E3/UL (ref 150–450)
POTASSIUM SERPL-SCNC: 4 MMOL/L (ref 3.5–5.2)
PROT SERPL-MCNC: 7 G/DL (ref 6–8.5)
RBC # BLD AUTO: 5.01 X10E6/UL (ref 3.77–5.28)
SODIUM SERPL-SCNC: 139 MMOL/L (ref 134–144)
SPECIMEN STATUS REPORT, ROLRST: NORMAL
TRIGL SERPL-MCNC: 158 MG/DL (ref 0–149)
VLDLC SERPL CALC-MCNC: 32 MG/DL (ref 5–40)
WBC # BLD AUTO: 6.8 X10E3/UL (ref 3.4–10.8)

## 2020-08-29 LAB
ALBUMIN/CREAT UR: 133 MG/G CREAT (ref 0–29)
CREAT UR-MCNC: 35.4 MG/DL
MICROALBUMIN UR-MCNC: 47.1 UG/ML

## 2020-08-31 ENCOUNTER — VIRTUAL VISIT (OUTPATIENT)
Dept: FAMILY MEDICINE CLINIC | Age: 70
End: 2020-08-31

## 2020-08-31 DIAGNOSIS — Z78.0 MENOPAUSE: ICD-10-CM

## 2020-08-31 DIAGNOSIS — Z00.00 MEDICARE ANNUAL WELLNESS VISIT, SUBSEQUENT: Primary | ICD-10-CM

## 2020-08-31 DIAGNOSIS — Z12.31 ENCOUNTER FOR SCREENING MAMMOGRAM FOR MALIGNANT NEOPLASM OF BREAST: ICD-10-CM

## 2020-08-31 DIAGNOSIS — Z12.11 SCREENING FOR COLON CANCER: ICD-10-CM

## 2020-08-31 DIAGNOSIS — E55.9 VITAMIN D DEFICIENCY: ICD-10-CM

## 2020-08-31 DIAGNOSIS — Z12.39 SCREENING FOR MALIGNANT NEOPLASM OF BREAST: ICD-10-CM

## 2020-08-31 DIAGNOSIS — E11.21 TYPE 2 DIABETES WITH NEPHROPATHY (HCC): ICD-10-CM

## 2020-08-31 NOTE — PROGRESS NOTES
Margarita Murrell is a 72 y.o. female (: 1950) presenting to address:    No chief complaint on file. There were no vitals filed for this visit. Hearing/Vision:   No exam data present    Learning Assessment:     Learning Assessment 2017   PRIMARY LEARNER Patient   HIGHEST LEVEL OF EDUCATION - PRIMARY LEARNER  > 4 YEARS OF COLLEGE   BARRIERS PRIMARY LEARNER NONE   CO-LEARNER CAREGIVER No   PRIMARY LANGUAGE ENGLISH   LEARNER PREFERENCE PRIMARY DEMONSTRATION   ANSWERED BY self   RELATIONSHIP SELF     Depression Screening:     3 most recent PHQ Screens 2020   Little interest or pleasure in doing things Not at all   Feeling down, depressed, irritable, or hopeless Not at all   Total Score PHQ 2 0     Fall Risk Assessment:     Fall Risk Assessment, last 12 mths 2020   Able to walk? Yes   Fall in past 12 months? No   Fall with injury? -     Abuse Screening:     Abuse Screening Questionnaire 2020   Do you ever feel afraid of your partner? N   Are you in a relationship with someone who physically or mentally threatens you? N   Is it safe for you to go home? N     Coordination of Care Questionaire:   1. Have you been to the ER, urgent care clinic since your last visit? Hospitalized since your last visit? NO    2. Have you seen or consulted any other health care providers outside of the [address] [place] [address] since your last visit? Include any pap smears or colon screening. NO    Advanced Directive:   1. Do you have an Advanced Directive? YES    2. Would you like information on Advanced Directives?  NO      Call the [phone] VV

## 2020-08-31 NOTE — PROGRESS NOTES
(AWV) The Initial Medicare Annual Wellness Exam PROGRESS NOTE    This is an Initial Medicare Annual Wellness Exam (AWV) (Performed 12 months after IPPE or effective date of Medicare Part B enrollment, Once in a lifetime)    I have reviewed the patient's medical history in detail and updated the computerized patient record. Mathew León is a 72 y.o. female, evaluated via audio-only technology on 8/31/2020 for Annual Wellness Visit  . Assessment & Plan:   Diagnoses and all orders for this visit:    1. Medicare annual wellness visit, subsequent    2. Vitamin D deficiency    3. Type 2 diabetes with nephropathy (HCC)  -     REFERRAL TO OPHTHALMOLOGY  -     REFERRAL TO PODIATRY    4. Screening for colon cancer  -     COLOGUARD TEST (FECAL DNA COLORECTAL CANCER SCREENING)    5. Screening for malignant neoplasm of breast  -     LISA MAMMO BI SCREENING INCL CAD; Future    6. Menopause  -     DEXA BONE DENSITY STUDY AXIAL; Future    7. Encounter for screening mammogram for malignant neoplasm of breast   -     LISA MAMMO BI SCREENING INCL CAD; Future        12  Subjective:       Prior to Admission medications    Medication Sig Start Date End Date Taking? Authorizing Provider   metFORMIN (GLUCOPHAGE) 500 mg tablet Take 1 Tab by mouth two (2) times daily (with meals) for 90 days. 8/21/20 11/19/20 Yes Ken Yarbrough MD   metoprolol succinate (TOPROL-XL) 100 mg tablet Take 1 Tab by mouth daily. 8/21/20  Yes Ken Yarbrough MD   pravastatin (PRAVACHOL) 20 mg tablet Take 1 Tab by mouth nightly. 8/21/20  Yes Ken Yarbrough MD   losartan (COZAAR) 100 mg tablet Take 1 Tab by mouth daily. 8/21/20  Yes Ken Yarbrough MD   ergocalciferol (ERGOCALCIFEROL) 1,250 mcg (50,000 unit) capsule Take 1 Cap by mouth every seven (7) days. 8/21/20  Yes Ken Yarbrough MD   fluticasone propionate (Flonase) 50 mcg/actuation nasal spray 2 Sprays by Both Nostrils route daily.  Indications: inflammation of the nose due to an allergy 8/21/20  Yes Ulices Singleton MD   busPIRone (BUSPAR) 5 mg tablet Take 1 Tab by mouth three (3) times daily (with meals). 8/21/20  Yes Ulices Singleton MD   amLODIPine (NORVASC) 5 mg tablet Take 1 Tab by mouth daily. 4/4/19  Yes Melanie Schwab MD   hydroCHLOROthiazide (HYDRODIURIL) 50 mg tablet Take 1 Tab by mouth daily. 3/7/19  Yes Melanie Schwab MD     Patient Active Problem List   Diagnosis Code    Controlled type 2 diabetes mellitus without complication, without long-term current use of insulin ([facility] [address] E11.9    Essential hypertension I10    Vitamin D deficiency E55.9    Mixed hyperlipidemia E78.2    Obesity, morbid ([address] E66.01    Type 2 diabetes with nephropathy ([address] E11.21    Anxiety F41.9     Patient Active Problem List    Diagnosis Date Noted    Anxiety 03/07/2019    Type 2 diabetes with nephropathy ([address] 03/06/2018    Obesity, morbid ([address] 12/11/2017    Mixed hyperlipidemia 06/23/2017    Controlled type 2 diabetes mellitus without complication, without long-term current use of insulin ([address] 06/19/2017    Essential hypertension 06/19/2017    Vitamin D deficiency 06/19/2017     Current Outpatient Medications   Medication Sig Dispense Refill    metFORMIN (GLUCOPHAGE) 500 mg tablet Take 1 Tab by mouth two (2) times daily (with meals) for 90 days. 180 Tab 0    metoprolol succinate (TOPROL-XL) 100 mg tablet Take 1 Tab by mouth daily. 90 Tab 0    pravastatin (PRAVACHOL) 20 mg tablet Take 1 Tab by mouth nightly. 90 Tab 0    losartan (COZAAR) 100 mg tablet Take 1 Tab by mouth daily. 90 Tab 0    ergocalciferol (ERGOCALCIFEROL) 1,250 mcg (50,000 unit) capsule Take 1 Cap by mouth every seven (7) days. 12 Cap 1    fluticasone propionate (Flonase) 50 mcg/actuation nasal spray 2 Sprays by Both Nostrils route daily. Indications: inflammation of the nose due to an allergy 1 Bottle 3    busPIRone (BUSPAR) 5 mg tablet Take 1 Tab by mouth three (3) times daily (with meals).  80 Tab 0    amLODIPine (NORVASC) 5 mg tablet Take 1 Tab by mouth daily. 90 Tab 1    hydroCHLOROthiazide (HYDRODIURIL) 50 mg tablet Take 1 Tab by mouth daily. 90 Tab 1     Allergies   Allergen Reactions    Penicillins Itching     Past Medical History:   Diagnosis Date    Anxiety     Arthritis     Diabetes ([address]     Hypercholesterolemia     Hypertension      Past Surgical History:   Procedure Laterality Date    HX GYN       Family History   Problem Relation Age of Onset    Heart Disease Mother     Liver Disease Father     Heart Disease Brother      Social History     Tobacco Use    Smoking status: Never Smoker    Smokeless tobacco: Never Used   Substance Use Topics    Alcohol use: No       Review of Systems   Constitutional: Negative for chills, fever, malaise/fatigue and weight loss. HENT: Negative for congestion, ear discharge, ear pain, hearing loss and nosebleeds. Eyes: Negative for blurred vision, double vision and discharge. Respiratory: Negative for cough, hemoptysis, sputum production, shortness of breath and wheezing. Cardiovascular: Negative for chest pain, palpitations, claudication and leg swelling. Gastrointestinal: Negative for abdominal pain, blood in stool, constipation, diarrhea, heartburn, melena, nausea and vomiting. Genitourinary: Negative for dysuria, flank pain, frequency, hematuria and urgency. Musculoskeletal: Negative for back pain, falls, joint pain, myalgias and neck pain. Skin: Negative for itching and rash. Neurological: Negative for dizziness, tingling, sensory change, speech change, focal weakness, seizures, loss of consciousness, weakness and headaches. Psychiatric/Behavioral: Negative for depression, hallucinations, memory loss, substance abuse and suicidal ideas. The patient has insomnia. The patient is not nervous/anxious. No flowsheet data found.      Radha Phipps, who was evaluated through a patient-initiated, synchronous (real-time) audio only encounter, and/or her healthcare decision maker, is aware that it is a billable service, with coverage as determined by her insurance carrier. She provided verbal consent to proceed: Yes. She has not had a related appointment within my department in the past 7 days or scheduled within the next 24 hours. Total Time: minutes: 21-30 minutes    Saturnino Gavin MD     Howie Jones is a 72 y.o. female and presents for an annual wellness exam       Patient Active Problem List   Diagnosis Code    Controlled type 2 diabetes mellitus without complication, without long-term current use of insulin ([address] E11.9    Essential hypertension I10    Vitamin D deficiency E55.9    Mixed hyperlipidemia E78.2    Obesity, morbid ([address] E66.01    Type 2 diabetes with nephropathy ([address] E11.21    Anxiety F41.9     Patient Active Problem List    Diagnosis Date Noted    Anxiety 03/07/2019    Type 2 diabetes with nephropathy ([address] 03/06/2018    Obesity, morbid ([address] 12/11/2017    Mixed hyperlipidemia 06/23/2017    Controlled type 2 diabetes mellitus without complication, without long-term current use of insulin ([address] 06/19/2017    Essential hypertension 06/19/2017    Vitamin D deficiency 06/19/2017     Current Outpatient Medications   Medication Sig Dispense Refill    metFORMIN (GLUCOPHAGE) 500 mg tablet Take 1 Tab by mouth two (2) times daily (with meals) for 90 days. 180 Tab 0    metoprolol succinate (TOPROL-XL) 100 mg tablet Take 1 Tab by mouth daily. 90 Tab 0    pravastatin (PRAVACHOL) 20 mg tablet Take 1 Tab by mouth nightly. 90 Tab 0    losartan (COZAAR) 100 mg tablet Take 1 Tab by mouth daily. 90 Tab 0    ergocalciferol (ERGOCALCIFEROL) 1,250 mcg (50,000 unit) capsule Take 1 Cap by mouth every seven (7) days. 12 Cap 1    fluticasone propionate (Flonase) 50 mcg/actuation nasal spray 2 Sprays by Both Nostrils route daily.  Indications: inflammation of the nose due to an allergy 1 Bottle 3    busPIRone (BUSPAR) 5 mg tablet Take 1 Tab by mouth three (3) times daily (with meals). 90 Tab 0    amLODIPine (NORVASC) 5 mg tablet Take 1 Tab by mouth daily. 90 Tab 1    hydroCHLOROthiazide (HYDRODIURIL) 50 mg tablet Take 1 Tab by mouth daily. 90 Tab 1     Allergies   Allergen Reactions    Penicillins Itching     Past Medical History:   Diagnosis Date    Anxiety     Arthritis     Diabetes ([address]     Hypercholesterolemia     Hypertension      Past Surgical History:   Procedure Laterality Date    HX GYN       Family History   Problem Relation Age of Onset    Heart Disease Mother     Liver Disease Father     Heart Disease Brother      Social History     Tobacco Use    Smoking status: Never Smoker    Smokeless tobacco: Never Used   Substance Use Topics    Alcohol use: No       ROS           History     Past Medical History:   Diagnosis Date    Anxiety     Arthritis     Diabetes [facility] [address]     Hypercholesterolemia     Hypertension       Past Surgical History:   Procedure Laterality Date    HX GYN       Current Outpatient Medications   Medication Sig Dispense Refill    metFORMIN (GLUCOPHAGE) 500 mg tablet Take 1 Tab by mouth two (2) times daily (with meals) for 90 days. 180 Tab 0    metoprolol succinate (TOPROL-XL) 100 mg tablet Take 1 Tab by mouth daily. 90 Tab 0    pravastatin (PRAVACHOL) 20 mg tablet Take 1 Tab by mouth nightly. 90 Tab 0    losartan (COZAAR) 100 mg tablet Take 1 Tab by mouth daily. 90 Tab 0    ergocalciferol (ERGOCALCIFEROL) 1,250 mcg (50,000 unit) capsule Take 1 Cap by mouth every seven (7) days. 12 Cap 1    fluticasone propionate (Flonase) 50 mcg/actuation nasal spray 2 Sprays by Both Nostrils route daily. Indications: inflammation of the nose due to an allergy 1 Bottle 3    busPIRone (BUSPAR) 5 mg tablet Take 1 Tab by mouth three (3) times daily (with meals). 90 Tab 0    amLODIPine (NORVASC) 5 mg tablet Take 1 Tab by mouth daily.  90 Tab 1    hydroCHLOROthiazide (HYDRODIURIL) 50 mg tablet Take 1 Tab by mouth daily. 90 Tab 1     Allergies   Allergen Reactions    Penicillins Itching     Family History   Problem Relation Age of Onset    Heart Disease Mother     Liver Disease Father     Heart Disease Brother      Social History     Tobacco Use    Smoking status: Never Smoker    Smokeless tobacco: Never Used   Substance Use Topics    Alcohol use: No     Patient Active Problem List   Diagnosis Code    Controlled type 2 diabetes mellitus without complication, without long-term current use of insulin (HCC) E11.9    Essential hypertension I10    Vitamin D deficiency E55.9    Mixed hyperlipidemia E78.2    Obesity, morbid ([facility] [address] E66.01    Type 2 diabetes with nephropathy ([facility] [address] E11.21    Anxiety F41.9       Health Maintenance History  Immunizations reviewed, and [employer] to obtain records up-to-date she has shingles     colonoscopy: At this time patient would like to go ahead with Cologuard      Chest CT:Non-smoker  Eye exam: Referral done today  Mammo referral today  Dexascan referral done today        Depression Risk Factor Screening:      Patient Health Questionnaire (PHQ-2)   Over the last 2 weeks, how often have you been bothered by any of the following problems? · Little interest or pleasure in doing things? · Not at all. [0]  · Feeling down, depressed, or hopeless? · Not at all. [0]    Total Score: 0/6  PHQ-2 Assessment Scoring:   A score of 2 or more requires further screening with the PHQ-9    Alcohol Risk Factor Screening:     Women: On any occasion during the past 3 months, have you had more than 3 drinks containing alcohol? No    Do you average more than 7 drinks per week?no   Men: On any occasion during the past 3 months, have you had more than 4 drinks containing alcohol? Do you average more than 14 drinks per week? Functional Ability and Level of Safety:     Hearing Loss    Hearing is good. Activities of Daily Living   Self-care.    Requires assistance with: no ADLs    Fall Risk   No fall risk factors    Abuse Screen   Patient is not abused  None    Examination   Physical Examination  There were no vitals filed for this visit. There is no height or weight on file to calculate BMI. Evaluation of Cognitive Function:  Mood/affect:sound good     Family member/caregiver input:not accompanied during this visit     Patient Care Team:  Mayra Liu MD as PCP - General (Internal Medicine)  Mayra Liu MD as PCP - [facility]    End-of-life planning  Advanced Directive in the case than an injury or illness causes the patient to be unable to make health care decisions    Health Care Directive or Living Will: yes    Advice/Referrals/Counselling/Plan:   Education and counseling provided:  Are appropriate based on today's review and evaluation  Screening Mammography  Colorectal cancer screening tests  Bone mass measurement (DEXA)  Screening for glaucoma  Include in education list (weight loss, physical activity, smoking cessation, fall prevention, and nutrition)    ICD-10-CM ICD-9-CM    1. Medicare annual wellness visit, subsequent  Z00.00 V70.0    2. Vitamin D deficiency  E55.9 268.9    3. Type 2 diabetes with nephropathy (HCC)  E11.21 250.40 REFERRAL TO OPHTHALMOLOGY     583.81 REFERRAL TO PODIATRY   4. Screening for colon cancer  Z12.11 V76.51 COLOGUARD TEST (FECAL DNA COLORECTAL CANCER SCREENING)   5. Screening for malignant neoplasm of breast  Z12.39 V76.10 LISA MAMMO BI SCREENING INCL CAD   6. Menopause  Z78.0 627.2 DEXA BONE DENSITY STUDY AXIAL   7. Encounter for screening mammogram for malignant neoplasm of breast   Z12.31 V76.12 LISA MAMMO BI SCREENING INCL CAD     Encounter Diagnoses   Name Primary?     Medicare annual wellness visit, subsequent Yes    Vitamin D deficiency     Type 2 diabetes with nephropathy [facility] [address]     Screening for colon cancer     Screening for malignant neoplasm of breast     Menopause     Encounter for screening mammogram for malignant neoplasm of breast       Orders Placed This Encounter    LISA MAMMO BI SCREENING INCL CAD    DEXA BONE DENSITY STUDY AXIAL    COLOGUARD TEST (FECAL DNA COLORECTAL CANCER SCREENING)    REFERRAL TO OPHTHALMOLOGY    REFERRAL TO PODIATRY     Orders Placed This Encounter    LISA MAMMO BI SCREENING INCL CAD     Standing Status:   Future     Standing Expiration Date:   10/1/2021     Scheduling Instructions:      [facility]     Order Specific Question:   Reason for Exam     Answer:   screening    DEXA BONE DENSITY STUDY AXIAL     Standing Status:   Future     Standing Expiration Date:   9/30/2021     Scheduling Instructions:      [facility]     Order Specific Question:   Reason for Exam     Answer:   sctreening  osteoperosis    COLOGUARD TEST (FECAL DNA COLORECTAL CANCER SCREENING)    REFERRAL TO OPHTHALMOLOGY     Referral Priority:   Routine     Referral Type:   Consultation     Referral Reason:   Specialty Services Required     Requested Specialty:   Ophthalmology     Number of Visits Requested:   1    REFERRAL TO PODIATRY     Referral Priority:   Routine     Referral Type:   Consultation     Referral Reason:   Specialty Services Required     Requested Specialty:   Podiatry     Number of Visits Requested:   1     Orders Placed This Encounter    LISA MAMMO BI SCREENING INCL CAD    DEXA BONE DENSITY STUDY AXIAL    COLOGUARD TEST (FECAL DNA COLORECTAL CANCER SCREENING)    REFERRAL TO OPHTHALMOLOGY    REFERRAL TO PODIATRY     Diagnoses and all orders for this visit:    1. Medicare annual wellness visit, subsequent    2. Vitamin D deficiency    3. Type 2 diabetes with nephropathy (HCC)  -     REFERRAL TO OPHTHALMOLOGY  -     REFERRAL TO PODIATRY    4. Screening for colon cancer  -     COLOGUARD TEST (FECAL DNA COLORECTAL CANCER SCREENING)    5. Screening for malignant neoplasm of breast  -     LISA MAMMO BI SCREENING INCL CAD; Future    6. Menopause  -     DEXA BONE DENSITY STUDY AXIAL; Future    7.  Encounter for screening mammogram for malignant neoplasm of breast   -     LISA MAMMO BI SCREENING INCL CAD; Future          current treatment plan is effective, no change in therapy. Brief written plan, checklist    I have discussed the diagnosis with the patient and the intended plan as seen in the above orders. The patient has received an after-visit summary and questions were answered concerning future plans. I have discussed medication side effects and warnings with the patient as well. I have reviewed the plan of care with the patient, accepted their input and they are in agreement with the treatment goals.                ____________________________________________________________    Problem Assessment    for treatment of   Chief Complaint   Patient presents with    Annual Wellness Visit             Lab review:-Discussed with patient that low vitamin D she need to be on a weekly vitamin D supplement 50,000 unit weekly also, diabetes is well controlled    Otherwise results are unremarkable

## 2020-11-02 ENCOUNTER — TELEPHONE (OUTPATIENT)
Dept: FAMILY MEDICINE CLINIC | Age: 70
End: 2020-11-02

## 2020-11-02 NOTE — TELEPHONE ENCOUNTER
I have received notification that patient has not completed Cologuard    Please encourage patient to complete it and send it back

## 2020-12-07 DIAGNOSIS — I10 ESSENTIAL HYPERTENSION: ICD-10-CM

## 2020-12-07 DIAGNOSIS — E78.2 MIXED HYPERLIPIDEMIA: ICD-10-CM

## 2020-12-07 DIAGNOSIS — E11.21 TYPE 2 DIABETES WITH NEPHROPATHY (HCC): ICD-10-CM

## 2020-12-07 RX ORDER — LOSARTAN POTASSIUM 100 MG/1
TABLET ORAL
Qty: 90 TAB | Refills: 0 | Status: SHIPPED | OUTPATIENT
Start: 2020-12-07 | End: 2021-08-06 | Stop reason: SDUPTHER

## 2020-12-07 RX ORDER — PRAVASTATIN SODIUM 20 MG/1
TABLET ORAL
Qty: 90 TAB | Refills: 0 | Status: SHIPPED | OUTPATIENT
Start: 2020-12-07 | End: 2021-08-20 | Stop reason: SDUPTHER

## 2020-12-07 RX ORDER — METFORMIN HYDROCHLORIDE 500 MG/1
TABLET ORAL
Qty: 180 TAB | Refills: 0 | Status: SHIPPED | OUTPATIENT
Start: 2020-12-07 | End: 2021-08-24 | Stop reason: SDUPTHER

## 2020-12-09 NOTE — TELEPHONE ENCOUNTER
Appt scheduled.      Future Appointments   Date Time Provider Patricia Waldron   3/10/2021 10:00 AM Radha Taylor MD [facility]

## 2021-07-19 ENCOUNTER — OFFICE VISIT (OUTPATIENT)
Dept: FAMILY MEDICINE CLINIC | Age: 71
End: 2021-07-19
Payer: MEDICARE

## 2021-07-19 VITALS
DIASTOLIC BLOOD PRESSURE: 130 MMHG | RESPIRATION RATE: 16 BRPM | SYSTOLIC BLOOD PRESSURE: 201 MMHG | HEART RATE: 88 BPM | BODY MASS INDEX: 50.41 KG/M2 | WEIGHT: 267 LBS | OXYGEN SATURATION: 97 % | TEMPERATURE: 97.8 F | HEIGHT: 61 IN

## 2021-07-19 DIAGNOSIS — E78.2 MIXED HYPERLIPIDEMIA: ICD-10-CM

## 2021-07-19 DIAGNOSIS — E55.9 VITAMIN D DEFICIENCY: ICD-10-CM

## 2021-07-19 DIAGNOSIS — I10 ESSENTIAL HYPERTENSION: ICD-10-CM

## 2021-07-19 DIAGNOSIS — E11.21 TYPE 2 DIABETES WITH NEPHROPATHY (HCC): ICD-10-CM

## 2021-07-19 PROCEDURE — G8400 PT W/DXA NO RESULTS DOC: HCPCS | Performed by: LEGAL MEDICINE

## 2021-07-19 PROCEDURE — G8427 DOCREV CUR MEDS BY ELIG CLIN: HCPCS | Performed by: LEGAL MEDICINE

## 2021-07-19 PROCEDURE — 3046F HEMOGLOBIN A1C LEVEL >9.0%: CPT | Performed by: LEGAL MEDICINE

## 2021-07-19 PROCEDURE — G9899 SCRN MAM PERF RSLTS DOC: HCPCS | Performed by: LEGAL MEDICINE

## 2021-07-19 PROCEDURE — G8755 DIAS BP > OR = 90: HCPCS | Performed by: LEGAL MEDICINE

## 2021-07-19 PROCEDURE — 3017F COLORECTAL CA SCREEN DOC REV: CPT | Performed by: LEGAL MEDICINE

## 2021-07-19 PROCEDURE — 2022F DILAT RTA XM EVC RTNOPTHY: CPT | Performed by: LEGAL MEDICINE

## 2021-07-19 PROCEDURE — 99214 OFFICE O/P EST MOD 30 MIN: CPT | Performed by: LEGAL MEDICINE

## 2021-07-19 PROCEDURE — G8536 NO DOC ELDER MAL SCRN: HCPCS | Performed by: LEGAL MEDICINE

## 2021-07-19 PROCEDURE — 1101F PT FALLS ASSESS-DOCD LE1/YR: CPT | Performed by: LEGAL MEDICINE

## 2021-07-19 PROCEDURE — G8753 SYS BP > OR = 140: HCPCS | Performed by: LEGAL MEDICINE

## 2021-07-19 PROCEDURE — 1090F PRES/ABSN URINE INCON ASSESS: CPT | Performed by: LEGAL MEDICINE

## 2021-07-19 PROCEDURE — G8510 SCR DEP NEG, NO PLAN REQD: HCPCS | Performed by: LEGAL MEDICINE

## 2021-07-19 PROCEDURE — G8417 CALC BMI ABV UP PARAM F/U: HCPCS | Performed by: LEGAL MEDICINE

## 2021-07-19 RX ORDER — HYDROCHLOROTHIAZIDE 50 MG/1
50 TABLET ORAL DAILY
Qty: 90 TABLET | Refills: 1 | Status: CANCELLED | OUTPATIENT
Start: 2021-07-19

## 2021-07-19 RX ORDER — PRAVASTATIN SODIUM 20 MG/1
TABLET ORAL
Qty: 90 TABLET | Refills: 0 | Status: CANCELLED | OUTPATIENT
Start: 2021-07-19

## 2021-07-19 RX ORDER — AMLODIPINE BESYLATE 5 MG/1
5 TABLET ORAL DAILY
Qty: 90 TABLET | Refills: 1 | Status: CANCELLED | OUTPATIENT
Start: 2021-07-19

## 2021-07-19 RX ORDER — METOPROLOL SUCCINATE 100 MG/1
100 TABLET, EXTENDED RELEASE ORAL DAILY
Qty: 90 TABLET | Refills: 0 | Status: CANCELLED | OUTPATIENT
Start: 2021-07-19

## 2021-07-19 RX ORDER — METFORMIN HYDROCHLORIDE 500 MG/1
TABLET ORAL
Qty: 180 TABLET | Refills: 0 | Status: CANCELLED | OUTPATIENT
Start: 2021-07-19

## 2021-07-19 RX ORDER — LOSARTAN POTASSIUM 100 MG/1
TABLET ORAL
Qty: 90 TABLET | Refills: 0 | Status: CANCELLED | OUTPATIENT
Start: 2021-07-19

## 2021-07-19 RX ORDER — ERGOCALCIFEROL 1.25 MG/1
50000 CAPSULE ORAL
Qty: 12 CAPSULE | Refills: 1 | Status: CANCELLED | OUTPATIENT
Start: 2021-07-19

## 2021-07-19 NOTE — PROGRESS NOTES
Sherren Barges is a 80 y.o. female (: 1950) presenting to address:    Chief Complaint   Patient presents with    Swelling     bilateral ankles       Vitals:    21 1455 21 1513   BP: (!) 201/130 (!) (P) 206/120   Pulse: 89 88   Resp: 16    Temp: 97.8 °F (36.6 °C)    TempSrc: Temporal    SpO2: 96% 97%   Weight: 267 lb (121.1 kg)    Height: 5' 1\" (1.549 m)    PainSc:   0 - No pain        Hearing/Vision:   No exam data present    Learning Assessment:     Learning Assessment 2017   PRIMARY LEARNER Patient   HIGHEST LEVEL OF EDUCATION - PRIMARY LEARNER  > 4 YEARS OF COLLEGE   BARRIERS PRIMARY LEARNER NONE   CO-LEARNER CAREGIVER No   PRIMARY LANGUAGE ENGLISH   LEARNER PREFERENCE PRIMARY DEMONSTRATION   ANSWERED BY self   RELATIONSHIP SELF     Depression Screening:     3 most recent PHQ Screens 2021   Little interest or pleasure in doing things Not at all   Feeling down, depressed, irritable, or hopeless Not at all   Total Score PHQ 2 0     Fall Risk Assessment:     Fall Risk Assessment, last 12 mths 2021   Able to walk? Yes   Fall in past 12 months? 0   Fall with injury? -     Abuse Screening:     Abuse Screening Questionnaire 2020   Do you ever feel afraid of your partner? N   Are you in a relationship with someone who physically or mentally threatens you? N   Is it safe for you to go home? N     Coordination of Care Questionaire:   1. Have you been to the ER, urgent care clinic since your last visit? Hospitalized since your last visit? NO    2. Have you seen or consulted any other health care providers outside of the [address] [place] [address] since your last visit? Include any pap smears or colon screening. NO    Advanced Directive:   1. Do you have an Advanced Directive? NO    2. Would you like information on Advanced Directives? NO      Patient stated she lost her medications and its been months since she's taken them.

## 2021-07-19 NOTE — PROGRESS NOTES
Alex Cardenas     Chief Complaint   Patient presents with    Swelling     bilateral ankles     Vitals:    07/19/21 1455 07/19/21 1513   BP: (!) 201/130 (!) (P) 206/120   Pulse: 89 88   Resp: 16    Temp: 97.8 °F (36.6 °C)    TempSrc: Temporal    SpO2: 96% 97%   Weight: 267 lb (121.1 kg)    Height: 5' 1\" (1.549 m)    PainSc:   0 - No pain          HPI: Patient is here with her  Son Rosana, blood pressure is elevated     No heacaches no sob ,no change in vision , no chest pain no dizziness, she has lower legs/ankle  swelling   She has been off her medication for over 7 month no lab in the last year     BP is elevated       I discussed with patient and her son that she need to be evaluation in the ER  Due to very high BP and leg edema     Past Medical History:   Diagnosis Date    Anxiety     Arthritis     Diabetes ([address]     Hypercholesterolemia     Hypertension      Past Surgical History:   Procedure Laterality Date    HX GYN       Social History     Tobacco Use    Smoking status: Never Smoker    Smokeless tobacco: Never Used   Substance Use Topics    Alcohol use: No       Family History   Problem Relation Age of Onset    Heart Disease Mother     Liver Disease Father     Heart Disease Brother        Review of Systems   Constitutional: Negative for chills, fever, malaise/fatigue and weight loss. HENT: Negative for congestion, ear discharge, ear pain, hearing loss and nosebleeds. Eyes: Negative for blurred vision, double vision and discharge. Respiratory: Negative for cough, hemoptysis, sputum production, shortness of breath and wheezing. Cardiovascular: Positive for leg swelling. Negative for chest pain, palpitations and claudication. Gastrointestinal: Negative for abdominal pain, constipation, diarrhea, nausea and vomiting. Genitourinary: Negative for dysuria, frequency and urgency. Musculoskeletal: Negative for myalgias. Skin: Negative for itching and rash.    Neurological: Negative for dizziness, tingling, sensory change, speech change, focal weakness, weakness and headaches. Physical Exam  Constitutional:       General: She is not in acute distress. Appearance: She is well-developed. She is not diaphoretic. HENT:      Head: Normocephalic and atraumatic. Eyes:      General: No scleral icterus. Right eye: No discharge. Left eye: No discharge. Neck:      Thyroid: No thyromegaly. Cardiovascular:      Rate and Rhythm: Normal rate and regular rhythm. Heart sounds: Normal heart sounds. Pulmonary:      Effort: Pulmonary effort is normal. No respiratory distress. Breath sounds: Normal breath sounds. No wheezing or rales. Chest:      Chest wall: No tenderness. Abdominal:      General: There is no distension. Palpations: Abdomen is soft. Tenderness: There is no abdominal tenderness. There is no rebound. Musculoskeletal:         General: No tenderness or deformity. Normal range of motion. Right lower leg: Edema present. Left lower leg: Edema present. Lymphadenopathy:      Cervical: No cervical adenopathy. Skin:     General: Skin is warm and dry. Coloration: Skin is not pale. Findings: No erythema or rash. Neurological:      Mental Status: She is alert and oriented to person, place, and time. Cranial Nerves: No cranial nerve deficit. Coordination: Coordination normal.   Psychiatric:         Behavior: Behavior normal.         Thought Content: Thought content normal.         Judgment: Judgment normal.          Assessment and plan     Plan of care has been discussed with the patient, he agrees to the plan and verbalized understanding. All his questions were answered  More than 50% of the time spent in this visit was counseling the patient about  illness and treatment options         1. Mixed hyperlipidemia  She has been out of medications    2.  Essential hypertension  Uncontrolled blood pressure she has not been on medications    3. Type 2 diabetes with nephropathy    4. Vitamin D deficiency      Current Outpatient Medications   Medication Sig Dispense Refill    losartan (COZAAR) 100 mg tablet Take 1 tablet by mouth once daily 90 Tab 0    metFORMIN (GLUCOPHAGE) 500 mg tablet TAKE 1 TABLET BY MOUTH TWICE DAILY WITH MEALS 180 Tab 0    pravastatin (PRAVACHOL) 20 mg tablet Take 1 tablet by mouth nightly 90 Tab 0    metoprolol succinate (TOPROL-XL) 100 mg tablet Take 1 Tab by mouth daily. 90 Tab 0    ergocalciferol (ERGOCALCIFEROL) 1,250 mcg (50,000 unit) capsule Take 1 Cap by mouth every seven (7) days. 12 Cap 1    fluticasone propionate (Flonase) 50 mcg/actuation nasal spray 2 Sprays by Both Nostrils route daily. Indications: inflammation of the nose due to an allergy 1 Bottle 3    busPIRone (BUSPAR) 5 mg tablet Take 1 Tab by mouth three (3) times daily (with meals). 90 Tab 0    amLODIPine (NORVASC) 5 mg tablet Take 1 Tab by mouth daily. 90 Tab 1    hydroCHLOROthiazide (HYDRODIURIL) 50 mg tablet Take 1 Tab by mouth daily. 90 Tab 1       Patient Active Problem List    Diagnosis Date Noted    Anxiety 03/07/2019    Type 2 diabetes with nephropathy ([facility] 03/06/2018    [facility], [facility] 12/11/2017    Mixed hyperlipidemia 06/23/2017    Controlled type 2 diabetes mellitus without complication, without long-term current use of insulin ([facility] 06/19/2017    Essential hypertension 06/19/2017    Vitamin D deficiency 06/19/2017     Results for orders placed or performed in visit on 08/21/20   CBC WITH AUTOMATED DIFF   Result Value Ref Range    WBC 6.8 3.4 - 10.8 x10E3/uL    RBC 5.01 3.77 - 5.28 x10E6/uL    HGB 12.6 11.1 - 15.9 g/dL    HCT 40.7 34.0 - 46.6 %    MCV 81 79 - 97 fL    MCH 25.1 (L) 26.6 - 33.0 pg    MCHC 31.0 (L) 31.5 - 35.7 g/dL    RDW 15.7 (H) 11.7 - 15.4 %    PLATELET 264 154 - 833 x10E3/uL    NEUTROPHILS 55 Not Estab. %    Lymphocytes 31 Not Estab. %    MONOCYTES 7 Not Estab. %    EOSINOPHILS 5 Not Estab. %    BASOPHILS 1 Not Estab. %    ABS. NEUTROPHILS 3.8 1.4 - 7.0 x10E3/uL    Abs Lymphocytes 2.1 0.7 - 3.1 x10E3/uL    ABS. MONOCYTES 0.5 0.1 - 0.9 x10E3/uL    ABS. EOSINOPHILS 0.4 0.0 - 0.4 x10E3/uL    ABS. BASOPHILS 0.0 0.0 - 0.2 x10E3/uL    IMMATURE GRANULOCYTES 1 Not Estab. %    ABS. IMM. GRANS. 0.1 0.0 - 0.1 J11G3/WG   METABOLIC PANEL, COMPREHENSIVE   Result Value Ref Range    Glucose 93 65 - 99 mg/dL    BUN 12 8 - 27 mg/dL    Creatinine 0.89 0.57 - 1.00 mg/dL    GFR est non-AA 66 >59 mL/min/1.73    GFR est AA 76 >59 mL/min/1.73    BUN/Creatinine ratio 13 12 - 28    Sodium 139 134 - 144 mmol/L    Potassium 4.0 3.5 - 5.2 mmol/L    Chloride 99 96 - 106 mmol/L    CO2 27 20 - 29 mmol/L    Calcium 9.0 8.7 - 10.3 mg/dL    Protein, total 7.0 6.0 - 8.5 g/dL    Albumin 3.7 (L) 3.8 - 4.8 g/dL    GLOBULIN, TOTAL 3.3 1.5 - 4.5 g/dL    A-G Ratio 1.1 (L) 1.2 - 2.2    Bilirubin, total 0.4 0.0 - 1.2 mg/dL    Alk.  phosphatase 66 39 - 117 IU/L    AST (SGOT) 10 0 - 40 IU/L    ALT (SGPT) 7 0 - 32 IU/L   LIPID PANEL   Result Value Ref Range    Cholesterol, total 174 100 - 199 mg/dL    Triglyceride 158 (H) 0 - 149 mg/dL    HDL Cholesterol 51 >39 mg/dL    VLDL, calculated 32 5 - 40 mg/dL    LDL, calculated 91 0 - 99 mg/dL   MICROALBUMIN, UR, RAND W/ MICROALB/CREAT RATIO   Result Value Ref Range    Creatinine, urine CANCELED mg/dL    Microalbumin, urine CANCELED    CVD REPORT   Result Value Ref Range    INTERPRETATION Note    VITAMIN D, 25 HYDROXY   Result Value Ref Range    VITAMIN D, 25-HYDROXY 22.0 (L) 30.0 - 100.0 ng/mL   HEMOGLOBIN A1C W/O EAG   Result Value Ref Range    Hemoglobin A1c 6.4 (H) 4.8 - 5.6 %   SPECIMEN STATUS REPORT   Result Value Ref Range    SPECIMEN STATUS REPORT COMMENT    MICROALBUMIN, UR, RAND W/ MICROALB/CREAT RATIO   Result Value Ref Range    Creatinine, urine 35.4 Not Estab. mg/dL    Microalbumin, urine 47.1 Not Estab. ug/mL    Microalb/Creat ratio (ug/mg creat.) 133 (H) 0 - 29 mg/g creat     No visits with results within 3 Month(s) from this visit. Latest known visit with results is:   Virtual Visit on 08/21/2020   Component Date Value Ref Range Status    WBC 08/27/2020 6.8  3.4 - 10.8 x10E3/uL Final    RBC 08/27/2020 5.01  3.77 - 5.28 x10E6/uL Final    HGB 08/27/2020 12.6  11.1 - 15.9 g/dL Final    HCT 08/27/2020 40.7  34.0 - 46.6 % Final    MCV 08/27/2020 81  79 - 97 fL Final    MCH 08/27/2020 25.1* 26.6 - 33.0 pg Final    MCHC 08/27/2020 31.0* 31.5 - 35.7 g/dL Final    RDW 08/27/2020 15.7* 11.7 - 15.4 % Final    PLATELET 10/28/1239 849  150 - 450 x10E3/uL Final    NEUTROPHILS 08/27/2020 55  Not Estab. % Final    Lymphocytes 08/27/2020 31  Not Estab. % Final    MONOCYTES 08/27/2020 7  Not Estab. % Final    EOSINOPHILS 08/27/2020 5  Not Estab. % Final    BASOPHILS 08/27/2020 1  Not Estab. % Final    ABS. NEUTROPHILS 08/27/2020 3.8  1.4 - 7.0 x10E3/uL Final    Abs Lymphocytes 08/27/2020 2.1  0.7 - 3.1 x10E3/uL Final    ABS. MONOCYTES 08/27/2020 0.5  0.1 - 0.9 x10E3/uL Final    ABS. EOSINOPHILS 08/27/2020 0.4  0.0 - 0.4 x10E3/uL Final    ABS. BASOPHILS 08/27/2020 0.0  0.0 - 0.2 x10E3/uL Final    IMMATURE GRANULOCYTES 08/27/2020 1  Not Estab. % Final    ABS. IMM.  GRANS. 08/27/2020 0.1  0.0 - 0.1 x10E3/uL Final    Glucose 08/27/2020 93  65 - 99 mg/dL Final    BUN 08/27/2020 12  8 - 27 mg/dL Final    Creatinine 08/27/2020 0.89  0.57 - 1.00 mg/dL Final    GFR est non-AA 08/27/2020 66  >59 mL/min/1.73 Final    GFR est AA 08/27/2020 76  >59 mL/min/1.73 Final    BUN/Creatinine ratio 08/27/2020 13  12 - 28 Final    Sodium 08/27/2020 139  134 - 144 mmol/L Final    Potassium 08/27/2020 4.0  3.5 - 5.2 mmol/L Final    Chloride 08/27/2020 99  96 - 106 mmol/L Final    CO2 08/27/2020 27  20 - 29 mmol/L Final    Calcium 08/27/2020 9.0  8.7 - 10.3 mg/dL Final    Protein, total 08/27/2020 7.0  6.0 - 8.5 g/dL Final    Albumin 08/27/2020 3.7* 3.8 - 4.8 g/dL Final    GLOBULIN, TOTAL 08/27/2020 3.3  1.5 - 4.5 g/dL Final    A-G Ratio 08/27/2020 1.1* 1.2 - 2.2 Final    Bilirubin, total 08/27/2020 0.4  0.0 - 1.2 mg/dL Final    Alk. phosphatase 08/27/2020 66  39 - 117 IU/L Final    AST (SGOT) 08/27/2020 10  0 - 40 IU/L Final    ALT (SGPT) 08/27/2020 7  0 - 32 IU/L Final    Cholesterol, total 08/27/2020 174  100 - 199 mg/dL Final    Triglyceride 08/27/2020 158* 0 - 149 mg/dL Final    HDL Cholesterol 08/27/2020 51  >39 mg/dL Final    VLDL, calculated 08/27/2020 32  5 - 40 mg/dL Final    LDL, calculated 08/27/2020 91  0 - 99 mg/dL Final    Comment: **Effective August 31, 2020, [facility] is implementing an improved**  equation to calculate Low Density Lipoprotein Cholesterol (LDL-C)  concentrations, to be used in all lipid panels that report calculated  LDL-C. This equation was developed through a collaboration with the  [employer] and [place] [id number] ([facility]). [1] The NIH  calculation overcomes the limitations of the existing Friedewald  LDL-C equation and performs equally well in both fasting and  non-fasting individuals. 1. Barbara ARANDA, et al. A new equation for  calculation of low-density lipoprotein cholesterol in patients with  normolipidemia and/or hypertriglyceridemia. MARCELL Cardiol. 2020 Feb 26. doi:[email or web address]      Creatinine, urine 08/27/2020 CANCELED  mg/dL Final-Edited    Comment: Test Not Performed. Patient was unable to void at the time of  collection. The following test(s) were not performed:    Result canceled by the ancillary.  Microalbumin, urine 08/27/2020 CANCELED   Final-Edited    Comment: Test not performed    Result canceled by the ancillary.  INTERPRETATION 08/27/2020 Note   Final    Comment: Medical Director's Note: Request Problem: TNP. Test Not  Performed. Patient was unable to void at the time of  collection.  The following test(s) were not performed: TEST:  [id number] Albumin/Creatinine Ratio,Urine  Medical Director's Note: Unable to Void: The patient was not  able to render a urine sample and has been instructed to  return for a urine collection at their earliest convenience. The urine testing that you have requested has been deleted  from this report. When the patient returns and provides a  urine specimen, the urine testing will be performed and  separately reported. Medical Director's Note: Albumin: Creatinine Ratio--TNP. Test Not Performed. Patient was unable to void at the time  of collection. The following test(s) were not performed:  Supplemental report is available.  VITAMIN D, 25-HYDROXY 08/27/2020 22.0* 30.0 - 100.0 ng/mL Final    Comment: Vitamin D deficiency has been defined by the [address] practice guideline as a  level of serum 25-OH vitamin D less than 20 ng/mL (1,2). The Endocrine Society went on to further define vitamin D  insufficiency as a level between 21 and 29 ng/mL (2). 1. IOM ([place] of Medicine). 2010. Dietary reference     intakes for calcium and D. [facility]: The     [employer]. 2. [employer], Leonardo IRIZARRY, Susan GAFFNEY, et al.     Evaluation, treatment, and prevention of vitamin D     deficiency: an Endocrine Society clinical practice     guideline. JCEM. 2011 Jul; 96(7):1911-30.  Hemoglobin A1c 08/27/2020 6.4* 4.8 - 5.6 % Final    Comment:          Prediabetes: 5.7 - 6.4           Diabetes: >6.4           Glycemic control for adults with diabetes: <7.0      SPECIMEN STATUS REPORT 08/27/2020 COMMENT   Final    Comment: Unable to Void  Unable to Void  The patient was not able to render a urine sample and has been  instructed to return for a urine collection at their earliest  convenience. The urine testing that you have requested has  been deleted from this report. When the patient returns and  provides a urine specimen, the urine testing will be performed  and separately reported.  Creatinine, urine 08/27/2020 35.4  Not Estab. mg/dL Final    Microalbumin, urine 08/27/2020 47.1  Not Estab. ug/mL Final    Microalb/Creat ratio (ug/mg creat.) 08/27/2020 133* 0 - 29 mg/g creat Final    Comment:                        Normal:                0 -  29                         Moderately increased: 30 - 300                         Severely increased:       >300                **Please note reference interval change**

## 2021-07-21 ENCOUNTER — TELEPHONE (OUTPATIENT)
Dept: FAMILY MEDICINE CLINIC | Age: 71
End: 2021-07-21

## 2021-08-06 ENCOUNTER — HOME HEALTH ADMISSION (OUTPATIENT)
Dept: HOME HEALTH SERVICES | Facility: HOME HEALTH | Age: 71
End: 2021-08-06
Payer: MEDICARE

## 2021-08-06 ENCOUNTER — TELEPHONE (OUTPATIENT)
Dept: FAMILY MEDICINE CLINIC | Age: 71
End: 2021-08-06

## 2021-08-06 ENCOUNTER — OFFICE VISIT (OUTPATIENT)
Dept: FAMILY MEDICINE CLINIC | Age: 71
End: 2021-08-06
Payer: MEDICARE

## 2021-08-06 VITALS
HEIGHT: 61 IN | DIASTOLIC BLOOD PRESSURE: 112 MMHG | HEART RATE: 67 BPM | BODY MASS INDEX: 50.03 KG/M2 | TEMPERATURE: 97.2 F | OXYGEN SATURATION: 96 % | RESPIRATION RATE: 16 BRPM | SYSTOLIC BLOOD PRESSURE: 180 MMHG | WEIGHT: 265 LBS

## 2021-08-06 DIAGNOSIS — E11.21 TYPE 2 DIABETES WITH NEPHROPATHY (HCC): Primary | ICD-10-CM

## 2021-08-06 DIAGNOSIS — E66.01 MORBID OBESITY WITH BMI OF 50.0-59.9, ADULT (HCC): ICD-10-CM

## 2021-08-06 DIAGNOSIS — E78.2 MIXED HYPERLIPIDEMIA: ICD-10-CM

## 2021-08-06 DIAGNOSIS — I10 ESSENTIAL HYPERTENSION: ICD-10-CM

## 2021-08-06 DIAGNOSIS — R22.41 MASS OF RIGHT THIGH: ICD-10-CM

## 2021-08-06 LAB — HBA1C MFR BLD HPLC: 5.8 %

## 2021-08-06 PROCEDURE — 99214 OFFICE O/P EST MOD 30 MIN: CPT | Performed by: LEGAL MEDICINE

## 2021-08-06 PROCEDURE — 83036 HEMOGLOBIN GLYCOSYLATED A1C: CPT | Performed by: LEGAL MEDICINE

## 2021-08-06 RX ORDER — LOSARTAN POTASSIUM 100 MG/1
TABLET ORAL
Qty: 90 TABLET | Refills: 1 | Status: SHIPPED | OUTPATIENT
Start: 2021-08-06

## 2021-08-06 NOTE — PROGRESS NOTES
Gokul Riggs     Chief Complaint   Patient presents with    Follow-up From Hospital     Vitals:    08/06/21 0908 08/06/21 1028   BP: (!) 180/120 (!) 180/112   Pulse: 67    Resp: 16    Temp: 97.2 °F (36.2 °C)    TempSrc: Temporal    SpO2: 96%    Weight: 265 lb (120.2 kg)    Height: 5' 1\" (1.549 m)    PainSc:   0 - No pain          HPI:  Patient is here for follow up after ER visit   She is doing ok no acute changes     Is accompanied by her son   she did have losartan 100 mg and was not taking it and BP is elevated  Today     She has right thigh pain and tenderness and mass was found on US need to get MRI     She will home health evaluation for PT and home safty       A well-defined heterogenous mass is identified in the mid medial thigh measuring 4.97 X 5.40 cm. Color Doppler and Spectral Doppler demonstrate arterial flow within. No evidence of deep venous thrombosis in the bilateral common femoral, femoral, deep femoral and popliteal veins. Technically compromised study therefore non-occlusive deep venous thrombosis cannot be excluded in the bilateral posterior tibial and peroneal veins as described in the findings.     Incidental finding of a vascularized mass in the right mid thigh as described. Reflux evaluation is deferred. Past Medical History:   Diagnosis Date    Anxiety     Arthritis     Diabetes ([address]     Hypercholesterolemia     Hypertension      Past Surgical History:   Procedure Laterality Date    HX GYN       Social History     Tobacco Use    Smoking status: Never Smoker    Smokeless tobacco: Never Used   Substance Use Topics    Alcohol use: No       Family History   Problem Relation Age of Onset    Heart Disease Mother     Liver Disease Father     Heart Disease Brother        Review of Systems   Constitutional: Negative for chills, fever, malaise/fatigue and weight loss.    HENT: Negative for congestion, ear discharge, ear pain, hearing loss, nosebleeds, sinus pain and sore throat. Eyes: Negative for blurred vision, double vision and discharge. Respiratory: Positive for shortness of breath. Negative for cough, hemoptysis, sputum production and wheezing. Cardiovascular: Negative for chest pain, palpitations, claudication and leg swelling. Gastrointestinal: Positive for diarrhea. Negative for abdominal pain, constipation, nausea and vomiting. Genitourinary: Negative for dysuria and urgency. Musculoskeletal: Negative for myalgias. Skin: Negative for itching and rash. Dry skin   Neurological: Negative for dizziness, tingling, sensory change, speech change, focal weakness, weakness and headaches. Psychiatric/Behavioral: Negative for depression and suicidal ideas. Physical Exam  Constitutional:       General: She is not in acute distress. Appearance: Normal appearance. She is well-developed. She is obese. She is not diaphoretic. HENT:      Head: Normocephalic and atraumatic. Mouth/Throat:      Pharynx: No oropharyngeal exudate. Eyes:      General: No scleral icterus. Right eye: No discharge. Left eye: No discharge. Conjunctiva/sclera: Conjunctivae normal.      Pupils: Pupils are equal, round, and reactive to light. Neck:      Thyroid: No thyromegaly. Cardiovascular:      Rate and Rhythm: Normal rate and regular rhythm. Heart sounds: Normal heart sounds. Pulmonary:      Effort: Pulmonary effort is normal. No respiratory distress. Breath sounds: Normal breath sounds. No wheezing or rales. Chest:      Chest wall: No tenderness. Abdominal:      General: There is no distension. Palpations: Abdomen is soft. Tenderness: There is no abdominal tenderness. There is no rebound. Hernia: A hernia is present. Comments: Small umbilical hernia reducible    Musculoskeletal:         General: Tenderness present. No deformity. Normal range of motion.       Comments: Right upper thigh tenderness Lymphadenopathy:      Cervical: No cervical adenopathy. Skin:     General: Skin is warm and dry. Coloration: Skin is not pale. Findings: No erythema or rash. Comments: Dry skin bilateral lower legs       Under stomach the skin is red and irritated and moist  No open sours    Neurological:      Mental Status: She is alert and oriented to person, place, and time. Cranial Nerves: No cranial nerve deficit. Coordination: Coordination normal.   Psychiatric:         Behavior: Behavior normal.         Thought Content: Thought content normal.         Judgment: Judgment normal.          Assessment and plan     Plan of care has been discussed with the patient, he agrees to the plan and verbalized understanding. All his questions were answered  More than 50% of the time spent in this visit was counseling the patient about  illness and treatment options         1. Essential hypertension  Need to start taking losartan and follow-up in 4 to 6 weeks for blood pressure check  - losartan (COZAAR) 100 mg tablet; Take 1 tablet by mouth once daily  Dispense: 90 Tablet; Refill: 1  - [address] [place]    2. Morbid obesity with BMI of 50.0-59.9, adult ([facility]  . Advised patient to eliminate sugar and to reduce carbohydrates intake  - REFERRAL TO HOME HEALTH    3. Type 2 diabetes with nephropathy ([facility] [address]  Diabetes well controlled hemoglobin A1c is 5.8     Consider decrease metformin o once daily ?  - REFERRAL TO [facility]  - AMB POC HEMOGLOBIN A1C    4. Mixed hyperlipidemia  Now she resumed pravastatin   - [address] [place]    5.  Mass of right thigh    - MRI FEMUR RT W CONT; Future    Current Outpatient Medications   Medication Sig Dispense Refill    losartan (COZAAR) 100 mg tablet Take 1 tablet by mouth once daily 90 Tablet 1    metFORMIN (GLUCOPHAGE) 500 mg tablet TAKE 1 TABLET BY MOUTH TWICE DAILY WITH MEALS 180 Tab 0    pravastatin (PRAVACHOL) 20 mg tablet Take 1 tablet by mouth nightly 90 Tab 0    metoprolol succinate (TOPROL-XL) 100 mg tablet Take 1 Tab by mouth daily. 90 Tab 0    ergocalciferol (ERGOCALCIFEROL) 1,250 mcg (50,000 unit) capsule Take 1 Cap by mouth every seven (7) days. 12 Cap 1    fluticasone propionate (Flonase) 50 mcg/actuation nasal spray 2 Sprays by Both Nostrils route daily. Indications: inflammation of the nose due to an allergy 1 Bottle 3    busPIRone (BUSPAR) 5 mg tablet Take 1 Tab by mouth three (3) times daily (with meals). 90 Tab 0    amLODIPine (NORVASC) 5 mg tablet Take 1 Tab by mouth daily. 90 Tab 1    hydroCHLOROthiazide (HYDRODIURIL) 50 mg tablet Take 1 Tab by mouth daily. 90 Tab 1       Patient Active Problem List    Diagnosis Date Noted    Anxiety 03/07/2019    Type 2 diabetes with nephropathy ([address] 03/06/2018    Obesity, morbid ([address] 12/11/2017    Mixed hyperlipidemia 06/23/2017    Controlled type 2 diabetes mellitus without complication, without long-term current use of insulin ([address] 06/19/2017    Essential hypertension 06/19/2017    Vitamin D deficiency 06/19/2017     Results for orders placed or performed in visit on 08/21/20   CBC WITH AUTOMATED DIFF   Result Value Ref Range    WBC 6.8 3.4 - 10.8 x10E3/uL    RBC 5.01 3.77 - 5.28 x10E6/uL    HGB 12.6 11.1 - 15.9 g/dL    HCT 40.7 34.0 - 46.6 %    MCV 81 79 - 97 fL    MCH 25.1 (L) 26.6 - 33.0 pg    MCHC 31.0 (L) 31.5 - 35.7 g/dL    RDW 15.7 (H) 11.7 - 15.4 %    PLATELET 091 241 - 235 x10E3/uL    NEUTROPHILS 55 Not Estab. %    Lymphocytes 31 Not Estab. %    MONOCYTES 7 Not Estab. %    EOSINOPHILS 5 Not Estab. %    BASOPHILS 1 Not Estab. %    ABS. NEUTROPHILS 3.8 1.4 - 7.0 x10E3/uL    Abs Lymphocytes 2.1 0.7 - 3.1 x10E3/uL    ABS. MONOCYTES 0.5 0.1 - 0.9 x10E3/uL    ABS. EOSINOPHILS 0.4 0.0 - 0.4 x10E3/uL    ABS. BASOPHILS 0.0 0.0 - 0.2 x10E3/uL    IMMATURE GRANULOCYTES 1 Not Estab. %    ABS. IMM.  GRANS. 0.1 0.0 - 0.1 B30M8/AJ   METABOLIC PANEL, COMPREHENSIVE   Result Value Ref Range Glucose 93 65 - 99 mg/dL    BUN 12 8 - 27 mg/dL    Creatinine 0.89 0.57 - 1.00 mg/dL    GFR est non-AA 66 >59 mL/min/1.73    GFR est AA 76 >59 mL/min/1.73    BUN/Creatinine ratio 13 12 - 28    Sodium 139 134 - 144 mmol/L    Potassium 4.0 3.5 - 5.2 mmol/L    Chloride 99 96 - 106 mmol/L    CO2 27 20 - 29 mmol/L    Calcium 9.0 8.7 - 10.3 mg/dL    Protein, total 7.0 6.0 - 8.5 g/dL    Albumin 3.7 (L) 3.8 - 4.8 g/dL    GLOBULIN, TOTAL 3.3 1.5 - 4.5 g/dL    A-G Ratio 1.1 (L) 1.2 - 2.2    Bilirubin, total 0.4 0.0 - 1.2 mg/dL    Alk. phosphatase 66 39 - 117 IU/L    AST (SGOT) 10 0 - 40 IU/L    ALT (SGPT) 7 0 - 32 IU/L   LIPID PANEL   Result Value Ref Range    Cholesterol, total 174 100 - 199 mg/dL    Triglyceride 158 (H) 0 - 149 mg/dL    HDL Cholesterol 51 >39 mg/dL    VLDL, calculated 32 5 - 40 mg/dL    LDL, calculated 91 0 - 99 mg/dL   MICROALBUMIN, UR, RAND W/ MICROALB/CREAT RATIO   Result Value Ref Range    Creatinine, urine CANCELED mg/dL    Microalbumin, urine CANCELED    CVD REPORT   Result Value Ref Range    INTERPRETATION Note    VITAMIN D, 25 HYDROXY   Result Value Ref Range    VITAMIN D, 25-HYDROXY 22.0 (L) 30.0 - 100.0 ng/mL   HEMOGLOBIN A1C W/O EAG   Result Value Ref Range    Hemoglobin A1c 6.4 (H) 4.8 - 5.6 %   SPECIMEN STATUS REPORT   Result Value Ref Range    SPECIMEN STATUS REPORT COMMENT    MICROALBUMIN, UR, RAND W/ MICROALB/CREAT RATIO   Result Value Ref Range    Creatinine, urine 35.4 Not Estab. mg/dL    Microalbumin, urine 47.1 Not Estab. ug/mL    Microalb/Creat ratio (ug/mg creat.) 133 (H) 0 - 29 mg/g creat     No visits with results within 3 Month(s) from this visit.    Latest known visit with results is:   Virtual Visit on 08/21/2020   Component Date Value Ref Range Status    WBC 08/27/2020 6.8  3.4 - 10.8 x10E3/uL Final    RBC 08/27/2020 5.01  3.77 - 5.28 x10E6/uL Final    HGB 08/27/2020 12.6  11.1 - 15.9 g/dL Final    HCT 08/27/2020 40.7  34.0 - 46.6 % Final    MCV 08/27/2020 81  79 - 97 fL Final    MCH 08/27/2020 25.1* 26.6 - 33.0 pg Final    MCHC 08/27/2020 31.0* 31.5 - 35.7 g/dL Final    RDW 08/27/2020 15.7* 11.7 - 15.4 % Final    PLATELET 51/19/2250 478  150 - 450 x10E3/uL Final    NEUTROPHILS 08/27/2020 55  Not Estab. % Final    Lymphocytes 08/27/2020 31  Not Estab. % Final    MONOCYTES 08/27/2020 7  Not Estab. % Final    EOSINOPHILS 08/27/2020 5  Not Estab. % Final    BASOPHILS 08/27/2020 1  Not Estab. % Final    ABS. NEUTROPHILS 08/27/2020 3.8  1.4 - 7.0 x10E3/uL Final    Abs Lymphocytes 08/27/2020 2.1  0.7 - 3.1 x10E3/uL Final    ABS. MONOCYTES 08/27/2020 0.5  0.1 - 0.9 x10E3/uL Final    ABS. EOSINOPHILS 08/27/2020 0.4  0.0 - 0.4 x10E3/uL Final    ABS. BASOPHILS 08/27/2020 0.0  0.0 - 0.2 x10E3/uL Final    IMMATURE GRANULOCYTES 08/27/2020 1  Not Estab. % Final    ABS. IMM. GRANS. 08/27/2020 0.1  0.0 - 0.1 x10E3/uL Final    Glucose 08/27/2020 93  65 - 99 mg/dL Final    BUN 08/27/2020 12  8 - 27 mg/dL Final    Creatinine 08/27/2020 0.89  0.57 - 1.00 mg/dL Final    GFR est non-AA 08/27/2020 66  >59 mL/min/1.73 Final    GFR est AA 08/27/2020 76  >59 mL/min/1.73 Final    BUN/Creatinine ratio 08/27/2020 13  12 - 28 Final    Sodium 08/27/2020 139  134 - 144 mmol/L Final    Potassium 08/27/2020 4.0  3.5 - 5.2 mmol/L Final    Chloride 08/27/2020 99  96 - 106 mmol/L Final    CO2 08/27/2020 27  20 - 29 mmol/L Final    Calcium 08/27/2020 9.0  8.7 - 10.3 mg/dL Final    Protein, total 08/27/2020 7.0  6.0 - 8.5 g/dL Final    Albumin 08/27/2020 3.7* 3.8 - 4.8 g/dL Final    GLOBULIN, TOTAL 08/27/2020 3.3  1.5 - 4.5 g/dL Final    A-G Ratio 08/27/2020 1.1* 1.2 - 2.2 Final    Bilirubin, total 08/27/2020 0.4  0.0 - 1.2 mg/dL Final    Alk.  phosphatase 08/27/2020 66  39 - 117 IU/L Final    AST (SGOT) 08/27/2020 10  0 - 40 IU/L Final    ALT (SGPT) 08/27/2020 7  0 - 32 IU/L Final    Cholesterol, total 08/27/2020 174  100 - 199 mg/dL Final    Triglyceride 08/27/2020 158* 0 - 149 mg/dL Final    HDL Cholesterol 08/27/2020 51  >39 mg/dL Final    VLDL, calculated 08/27/2020 32  5 - 40 mg/dL Final    LDL, calculated 08/27/2020 91  0 - 99 mg/dL Final    Comment: **Effective August 31, 2020, [facility] is implementing an improved**  equation to calculate Low Density Lipoprotein Cholesterol (LDL-C)  concentrations, to be used in all lipid panels that report calculated  LDL-C. This equation was developed through a collaboration with the  [employer] and [facility] ([facility]). [1] The NIH  calculation overcomes the limitations of the existing Friedewald  LDL-C equation and performs equally well in both fasting and  non-fasting individuals. 1. Donato Holstein Q, et al. A new equation for  calculation of low-density lipoprotein cholesterol in patients with  normolipidemia and/or hypertriglyceridemia. MARCELL Cardiol. 2020 Feb 26. doi:10.1001/jamacardio.2020.0013      Creatinine, urine 08/27/2020 CANCELED  mg/dL Final-Edited    Comment: Test Not Performed. Patient was unable to void at the time of  collection. The following test(s) were not performed:    Result canceled by the ancillary.  Microalbumin, urine 08/27/2020 CANCELED   Final-Edited    Comment: Test not performed    Result canceled by the ancillary.  INTERPRETATION 08/27/2020 Note   Final    Comment: Medical Director's Note: Request Problem: TNP. Test Not  Performed. Patient was unable to void at the time of  collection. The following test(s) were not performed: TEST:  [id number] Albumin/Creatinine Ratio,Urine  Medical Director's Note: Unable to Void: The patient was not  able to render a urine sample and has been instructed to  return for a urine collection at their earliest convenience. The urine testing that you have requested has been deleted  from this report. When the patient returns and provides a  urine specimen, the urine testing will be performed and  separately reported.   Medical Director's Note: Albumin: Creatinine Ratio--TNP. Test Not Performed. Patient was unable to void at the time  of collection. The following test(s) were not performed:  Supplemental report is available.  VITAMIN D, 25-HYDROXY 08/27/2020 22.0* 30.0 - 100.0 ng/mL Final    Comment: Vitamin D deficiency has been defined by the [address] practice guideline as a  level of serum 25-OH vitamin D less than 20 ng/mL (1,2). The Endocrine Society went on to further define vitamin D  insufficiency as a level between 21 and 29 ng/mL (2). 1. IOM ([place] of [employer]). 2010. Dietary reference     intakes for calcium and D. [facility]: The     Envia L[address]. 2. Home MF, Leonardo IRIZARRY, Susan GAFFNEY, et al.     Evaluation, treatment, and prevention of vitamin D     deficiency: an Endocrine Society clinical practice     guideline. JCEM. 2011 Jul; 96(7):1911-30.  Hemoglobin A1c 08/27/2020 6.4* 4.8 - 5.6 % Final    Comment:          Prediabetes: 5.7 - 6.4           Diabetes: >6.4           Glycemic control for adults with diabetes: <7.0      SPECIMEN STATUS REPORT 08/27/2020 COMMENT   Final    Comment: Unable to Void  Unable to Void  The patient was not able to render a urine sample and has been  instructed to return for a urine collection at their earliest  convenience. The urine testing that you have requested has  been deleted from this report. When the patient returns and  provides a urine specimen, the urine testing will be performed  and separately reported.       Creatinine, urine 08/27/2020 35.4  Not Estab. mg/dL Final    Microalbumin, urine 08/27/2020 47.1  Not Estab. ug/mL Final    Microalb/Creat ratio (ug/mg creat.) 08/27/2020 133* 0 - 29 mg/g creat Final    Comment:                        Normal:                0 -  29                         Moderately increased: 30 - 300                         Severely increased:       >300                **Please note reference interval change**            Follow-up and Dispositions    · Return in about 2 months (around 10/6/2021) for for medicare wellness.

## 2021-08-06 NOTE — PROGRESS NOTES
Karyle Sessions is a 80 y.o. female (: 1950) presenting to address:    Chief Complaint   Patient presents with   [facility] [address]:    21 0908   BP: (!) 180/120   Pulse: 67   Resp: 16   Temp: 97.2 °F (36.2 °C)   TempSrc: Temporal   SpO2: 96%   Weight: 265 lb (120.2 kg)   Height: 5' 1\" (1.549 m)   PainSc:   0 - No pain       Hearing/Vision:   No exam data present    Learning Assessment:     Learning Assessment 2017   PRIMARY LEARNER Patient   HIGHEST LEVEL OF EDUCATION - PRIMARY LEARNER  > 4 YEARS OF COLLEGE   BARRIERS PRIMARY LEARNER NONE   CO-LEARNER CAREGIVER No   PRIMARY LANGUAGE ENGLISH   LEARNER PREFERENCE PRIMARY DEMONSTRATION   ANSWERED BY self   RELATIONSHIP SELF     Depression Screening:     3 most recent PHQ Screens 2021   Little interest or pleasure in doing things Not at all   Feeling down, depressed, irritable, or hopeless Not at all   Total Score PHQ 2 0     Fall Risk Assessment:     Fall Risk Assessment, last 12 mths 2021   Able to walk? Yes   Fall in past 12 months? 0   Fall with injury? -     Abuse Screening:     Abuse Screening Questionnaire 2021   Do you ever feel afraid of your partner? N   Are you in a relationship with someone who physically or mentally threatens you? N   Is it safe for you to go home? Y     Coordination of Care Questionaire:   1. Have you been to the ER, urgent care clinic since your last visit? Hospitalized since your last visit? YES    2. Have you seen or consulted any other health care providers outside of the [address] [place] [address] since your last visit? Include any pap smears or colon screening. NO    Advanced Directive:   1. Do you have an Advanced Directive? NO    2. Would you like information on Advanced Directives?  NO

## 2021-08-06 NOTE — TELEPHONE ENCOUNTER
Pt's son called on behalf of the pt. He stated that his mother just wanted to leave the name and number of the eye doctor Sharlene Burciaga phone number [phone].  Please advise

## 2021-08-08 ENCOUNTER — HOME CARE VISIT (OUTPATIENT)
Dept: SCHEDULING | Facility: HOME HEALTH | Age: 71
End: 2021-08-08
Payer: MEDICARE

## 2021-08-08 VITALS
RESPIRATION RATE: 17 BRPM | HEART RATE: 77 BPM | OXYGEN SATURATION: 97 % | SYSTOLIC BLOOD PRESSURE: 140 MMHG | TEMPERATURE: 97.3 F | DIASTOLIC BLOOD PRESSURE: 70 MMHG

## 2021-08-08 PROCEDURE — 400013 HH SOC

## 2021-08-08 PROCEDURE — 3331090001 HH PPS REVENUE CREDIT

## 2021-08-08 PROCEDURE — G0151 HHCP-SERV OF PT,EA 15 MIN: HCPCS

## 2021-08-08 PROCEDURE — 400018 HH-NO PAY CLAIM PROCEDURE

## 2021-08-08 PROCEDURE — 3331090002 HH PPS REVENUE DEBIT

## 2021-08-08 NOTE — Clinical Note
patient will be seen 3w1 2w3  for PT to improve overall functional mobility by graded therapeutic exercises, therapeutic activities, gait training, balance training and patient/caregiver education for safety at home.   Thank you for your referral

## 2021-08-08 NOTE — HOME HEALTH
Skilled services/Home bound verification:     Skilled Reason for admission/summary of clinical condition:  Female patient who was sent to ER with hypertension and was found to have a mass on R medial aspect of thigh. Patient with morbid obesity diagnosis who has difficulty with gait and requires use of SPC due to pain on RLE and impaired balance. This patient is homebound for the following reasons Requires considerable and taxing effort to leave the home . Caregiver: relative. Caregiver assists with IADL's. Medications reconciled and all medications are available in the home this visit. The following education was provided regarding medications, medication interactions, and look alike medications (specify): Christie Rued Medications  are effective at this time. High risk medication teaching regarding anticoagulants, hyperglycemic agents or opiod narcotics performed (specify) metformin for risk of hypoglycemia ,    Dr. Ace Reyes notified of any discrepancies/medication interactions no severe interaction noted . Home health supplies by type and quantity ordered/delivered this visit include: none    Patient education provided this visit to include: HEP, fall prevention, dc planning . Patient/caregiver degree of understanding:good    Home exercise program/Homework provided: patient educated with HEP including seated hip flex, knee extension, hip abduction, hip adduction, ankle PF and DF and ball squeeze x 20 reps x 3 sets daily to improve MMT on BLE to facilitate with improved bed mobility, transfers and gait with AD. patient also educated with deep breathing exercises to be done daily x 10 reps x 3 sets to prevent SOB during activity. Patient educated with fall prevention technique by decluttering space, proper use of AD and footwear    Pt/Caregiver instructed on plan of care and are agreeable to plan of care at this time.       Physician Dr. Ace Reyes notified of patient admission to home health and plan of care including anticipated frequency of 3w1 2w3 for PT    Discharge planning discussed with patient and caregiver. Discharge planning as follows: dc to family with MD supervision when all goals are met . Pt/Caregiver did verbalize understanding of discharge planning. Next MD appointment TBD  Patient/caregiver encouraged/instructed to keep appointment as lack of follow through with physician appointment could result in discontinuation of home care services for non-compliance. Prior to the visit, the patient was screened for the followin.) International travel in the last month:             no  2.) Exposure Screening:  Contact with anyone with the following symptoms in the last 14 days:  no  a.) Fever, or lower respiratory illness (shortness of breath or cough) or   b.) Fever with severe acute respiratory illness (pneumonia or acute respiratory distress syndrome) no  3.) The patient has the following symptoms:  a.) Fever, cough, shortness of breath no  PMHx: Anxiety      Arthritis      Diabetes ([address]      Hypercholesterolemia      Hypertension     S: chief complaint: pain on L hand and RLE with pain scale 8/10 that is managed by rest. per patient, she is planning to take tylenol to decrease pain. Patient also complained of weakness of BLE and impaired ability with overall functional mobility   O:Patients Goals= Be able to go back to PLOF  Wound/Incision: location, description, drainage: none  PLOF: Lives in 1 level house by herself, his son is staying with patient at this time.  prior to referral, she was independent with ADL's and IADL's was using SPC inside the house and RW for community mobility   STRENGTH R hip flexor, extensor hip abductors, adductors 3/5, L hip flexor extensor abductors adductors 3+/5, R knee flexor and extensor 3/5 L knee flexor and extensor 3+/5  BALANCE  Tinetti  high fall risk due to reduced strength on BLE, gait deviation and decreased efficiency of balance reactions. Patient demonstrated poor use of hip and ankle strategy during standing balance activity. Patient requires support from AD at all times for safety and stability. GAIT Patient ambulated with [facility] with supervision x 15 feet with noted antalgic gait on RLE with decreased step height and stride length on RLE. Patient was educated with importance of using AD at all times and using RW more than the cane to prevent LOB and falls   TUG 45 seconds   BED MOBILITY Patient needed Min A x 1 with bed mobility  TRANSFERSPatient needed Mod A x1 with verbal cues for safety and technique to and from bed, chair and toilet   A: PT evaluation completed with the presence of son who is the primary CG, POC established, Med rec done, HEP established  P:Home Safety eval/recommendations: Home health physical therapy initial evaluation performed. Patient demonstrates decreased strength, balance, and endurance which increases patient's overall fall risk and burden of care. Patient would benefit from home health physical therapy to improve balance, strength, and endurance which would decrease fall risk and allow patient to return to prior level of function once all functional goals or full rehab potential is met. patient educated with HEP including seated hip flex, knee extension, hip abduction, hip adduction, ankle PF and DF and ball squeeze x 20 reps x 3 sets daily to improve MMT on BLE to facilitate with improved bed mobility, transfers and gait with AD. patient also educated with deep breathing exercises to be done daily x 10 reps x 3 sets to prevent SOB during activity.  Patient educated with fall prevention technique by decluttering space, proper use of AD and footwear

## 2021-08-09 ENCOUNTER — HOME CARE VISIT (OUTPATIENT)
Dept: HOME HEALTH SERVICES | Facility: HOME HEALTH | Age: 71
End: 2021-08-09
Payer: MEDICARE

## 2021-08-09 PROCEDURE — 3331090001 HH PPS REVENUE CREDIT

## 2021-08-09 PROCEDURE — 3331090002 HH PPS REVENUE DEBIT

## 2021-08-10 PROCEDURE — 3331090001 HH PPS REVENUE CREDIT

## 2021-08-10 PROCEDURE — 3331090002 HH PPS REVENUE DEBIT

## 2021-08-11 ENCOUNTER — HOME CARE VISIT (OUTPATIENT)
Dept: SCHEDULING | Facility: HOME HEALTH | Age: 71
End: 2021-08-11
Payer: MEDICARE

## 2021-08-11 PROCEDURE — G0157 HHC PT ASSISTANT EA 15: HCPCS

## 2021-08-11 PROCEDURE — 3331090001 HH PPS REVENUE CREDIT

## 2021-08-11 PROCEDURE — 3331090002 HH PPS REVENUE DEBIT

## 2021-08-12 ENCOUNTER — HOME CARE VISIT (OUTPATIENT)
Dept: SCHEDULING | Facility: HOME HEALTH | Age: 71
End: 2021-08-12
Payer: MEDICARE

## 2021-08-12 VITALS — OXYGEN SATURATION: 94 % | TEMPERATURE: 97.3 F | HEART RATE: 56 BPM

## 2021-08-12 PROCEDURE — 3331090001 HH PPS REVENUE CREDIT

## 2021-08-12 PROCEDURE — G0152 HHCP-SERV OF OT,EA 15 MIN: HCPCS

## 2021-08-12 PROCEDURE — 3331090002 HH PPS REVENUE DEBIT

## 2021-08-13 ENCOUNTER — TELEPHONE (OUTPATIENT)
Dept: FAMILY MEDICINE CLINIC | Age: 71
End: 2021-08-13

## 2021-08-13 ENCOUNTER — HOME CARE VISIT (OUTPATIENT)
Dept: SCHEDULING | Facility: HOME HEALTH | Age: 71
End: 2021-08-13
Payer: MEDICARE

## 2021-08-13 VITALS
SYSTOLIC BLOOD PRESSURE: 144 MMHG | DIASTOLIC BLOOD PRESSURE: 90 MMHG | TEMPERATURE: 97.8 F | HEART RATE: 60 BPM | OXYGEN SATURATION: 96 %

## 2021-08-13 PROCEDURE — 3331090001 HH PPS REVENUE CREDIT

## 2021-08-13 PROCEDURE — G0157 HHC PT ASSISTANT EA 15: HCPCS

## 2021-08-13 PROCEDURE — 3331090002 HH PPS REVENUE DEBIT

## 2021-08-13 NOTE — TELEPHONE ENCOUNTER
Tonny mckeon/ [facility] - INPATIENT called in regards to the patient's bp at her visit. The highest it was was 200/ . She had the patient eat a snack and sit in a reclined position and took it again. The lowest it was was 150/110. Spoke to Dr Nunzio Sacks and she advised me to tell her to tell the patient to go to urgent care or the ER. She will tell the patient that. Also she thinks that the patient would also benefit from skilled nursing and telehealth. She is wanting to know if a referral could be placed for both of those. Please advise.

## 2021-08-13 NOTE — Clinical Note
Call at 14:37 to pt's PCP, Saida Serrano MD to notify them of pt's elevated BP. Speak with Corry and relay the pt's elevated BP readings and unkown  blood sugar levels. MD wants pt to go to [facility], because MD's office will not be open much longer. Request evaluation for SN and telehealth for education on pt's current disease state and daily monitoring of pt's vitals. Corry reports that those should not be a problem.

## 2021-08-13 NOTE — HOME HEALTH
Caregiver involvement: Mari Bar (son) lives locally and provides pt with daily emotional support. Medications reconciled and all medications are available in the home this visit. The following education was provided regarding medications, medication interactions, and look a like medications: Continue as directed by MD.  Medications  are somewhat effective at this time. Added tylenol for arthritis into med list.    Home health supplies by type and quantity ordered/delivered this visit include: na    Patient education provided this visit:  Educated pt on energy conservation with ADL and IADL tasks. Suggested sitting at bathroom sink for improved participation with bathing and grooming with ease of reaching bathing items. Progress toward goals: Ms. Yamile Acuna has excellent potential to return to her independent PLOF. She is grossly SBA with ADL tasks and transfers, but reports significant difficulty performing IADL tasks required for independent living due to poor activity tolerance and limited standing balance. Home exercise program: complete with S with use of handout for carryover to improve UB strength with ADL and functional mobility. Continued need for the following skills: Nursing, Physical Therapy and Occupational Therapy    The following discharge planning was discussed with the pt/caregiver: 1w4 with plans to educate pt on energy conservation to improve safety and participation with ADL and IADL tasks. CONTINUED NEED FOR THE FOLLOWING SKILLS: HH OT is medically necessary to address barriers of decreased activity tolerance, decreased balance, generalized weakness and obesity. These barriers limit pt's participation in ADL and functional mobility safely and would benefit from continued skilled OT to maximize independende and reduce burden on caregiver.         Note taken from MD visit on 8/6/21  HPI:  Patient is here for follow up after ER visit     She is doing ok no acute changes    Is accompanied by her son     she did have losartan 100 mg and was not taking it and BP is elevated  Today     She has right thigh pain and tenderness and mass was found on US need to get MRI     She will home health evaluation for PT and home safty     A well-defined heterogenous mass is identified in the mid medial thigh measuring 4.97 X 5.40 cm. Color Doppler and Spectral Doppler demonstrate arterial flow within. No evidence of deep venous thrombosis in the bilateral common femoral, femoral, deep femoral and popliteal veins. Technically compromised study therefore non-occlusive deep venous thrombosis cannot be excluded in the bilateral posterior tibial and peroneal veins as described in the findings. Incidental finding of a vascularized mass in the right mid thigh as described. Reflux evaluation is deferred.

## 2021-08-14 ENCOUNTER — HOME CARE VISIT (OUTPATIENT)
Dept: HOME HEALTH SERVICES | Facility: HOME HEALTH | Age: 71
End: 2021-08-14
Payer: MEDICARE

## 2021-08-14 VITALS
DIASTOLIC BLOOD PRESSURE: 110 MMHG | HEART RATE: 56 BPM | OXYGEN SATURATION: 93 % | SYSTOLIC BLOOD PRESSURE: 150 MMHG | TEMPERATURE: 97.1 F

## 2021-08-14 PROCEDURE — 3331090001 HH PPS REVENUE CREDIT

## 2021-08-14 PROCEDURE — 3331090002 HH PPS REVENUE DEBIT

## 2021-08-15 PROCEDURE — 3331090002 HH PPS REVENUE DEBIT

## 2021-08-15 PROCEDURE — 3331090001 HH PPS REVENUE CREDIT

## 2021-08-15 NOTE — HOME HEALTH
S:  Pt's blood sugar levels are still unkown. They have not been monitoring her blood sugar levels on a regular basis because they do not have a functioning glucometer. Pt is waiting for inurance provided device. Pt is not new to diabetes, but she is unable to recall important details with knowledge of diabetes and vitals to maintain good health. They are also waitining for new Medicare card. Pt's son called on 8/11/21 during previous PT session. Pain:  0/10  Medication: Reviewed   Future MD appointments: TBD  O:  Pt still has edema in callie LEs/feet. L foot has less edema than during the last visit. HEP: Exercises put on hold due to elevated BP and unknown blood sugar levels   Gait Training: Pt amb 2 x 35 ft with FWW SUP A to go to and from the bathroom. Pt amb with with suffling gait pattern. Communications: Call at 14:37 to pt's PCP, Filomena Lindsey MD to notify them of pt's elevated BP. Speak with Corry and relay the pt's elevated BP readings and unkown  blood sugar levels. MD wants pt to go to [facility], because MD's office will not be open much longer. Request evaluation for SN and telehealth for education on pt's current disease state and daily monitoring of pt's vitals. Corry reports that those should not be a problem. Pt/Caregiver education: Review and issue diabetic teaching packet. Pt instructed in HEP above and instructed to perform 2x's/day  A: Patient demonstrated a negative result to therapy this date as evidence by pt's inability to maintain acceptable BP to participate. Progressing poorly toward all goals. Pt continues to have deficits with all interventions. Patient will benefit from continued intervention with progression of all goals once pt's vitals have stabilized. MEDICATIONS: There are no changes in medications. Medications appear to be effective at this time.   Pt/CAREGIVER EDUCATION:     Pt verbalizes a good understanding of pt education; including HEP, diabetic self-care, health maintenance, POC and D/C planning. Equipment needs:  Other services:   P: Pt frequencies: 3 w 1, 2 w 3. Pt has 2 visits next week. There is a continued need for skilled PT to assess pt's vitals and address PT goals as long as pt maintains good vitals.

## 2021-08-16 ENCOUNTER — HOME CARE VISIT (OUTPATIENT)
Dept: SCHEDULING | Facility: HOME HEALTH | Age: 71
End: 2021-08-16
Payer: MEDICARE

## 2021-08-16 PROCEDURE — 3331090001 HH PPS REVENUE CREDIT

## 2021-08-16 PROCEDURE — G0157 HHC PT ASSISTANT EA 15: HCPCS

## 2021-08-16 PROCEDURE — 3331090002 HH PPS REVENUE DEBIT

## 2021-08-16 NOTE — TELEPHONE ENCOUNTER
Is she having any symptoms like shortness of breath chest pain blurred vision  ? ?   If yes she need to go to the hospital immediately      Otherwise she can increase amlodipine and instead of 5 mg to take 2 tablets 10 mg and have her schedule follow-up next week

## 2021-08-16 NOTE — Clinical Note
added thank you       ----- Message -----  From: Gale Gordon PTA  Sent: 8/18/2021   8:44 AM EDT  To: Ronnie Stinson PT      Patient was placed on Amlodipine 5 mg take 1 tablet by mouth daily for BP at Urgent care.

## 2021-08-17 PROCEDURE — 3331090001 HH PPS REVENUE CREDIT

## 2021-08-17 PROCEDURE — 3331090002 HH PPS REVENUE DEBIT

## 2021-08-17 NOTE — TELEPHONE ENCOUNTER
I was advised to call Nolan Velasquez [phone], [facility] Most is not longer working with her. She did inform me patient went to [facility] and they adjusted her medication.

## 2021-08-18 VITALS
HEART RATE: 62 BPM | DIASTOLIC BLOOD PRESSURE: 80 MMHG | OXYGEN SATURATION: 93 % | RESPIRATION RATE: 18 BRPM | TEMPERATURE: 98.3 F | SYSTOLIC BLOOD PRESSURE: 140 MMHG

## 2021-08-18 DIAGNOSIS — I10 ESSENTIAL HYPERTENSION: ICD-10-CM

## 2021-08-18 DIAGNOSIS — E55.9 VITAMIN D DEFICIENCY: ICD-10-CM

## 2021-08-18 PROCEDURE — 3331090002 HH PPS REVENUE DEBIT

## 2021-08-18 PROCEDURE — 3331090001 HH PPS REVENUE CREDIT

## 2021-08-18 NOTE — TELEPHONE ENCOUNTER
Requested Prescriptions     Pending Prescriptions Disp Refills    ergocalciferol (ERGOCALCIFEROL) 1,250 mcg (50,000 unit) capsule 12 Capsule 1     Sig: Take 1 Capsule by mouth every seven (7) days.  metoprolol succinate (TOPROL-XL) 100 mg tablet 90 Tablet 0     Sig: Take 1 Tablet by mouth daily. Pt's daughter called stating she was trying to return cierra's call. She is also concerned because her mother is out of both of these scripts. Please advise.      Future Appointments   Date Time Provider Department Center   8/19/2021 To Be Determined Indra Mderano, [address]   8/19/2021  1:30 PM Yaneli Haskins [facility] HR HH [facility]   8/24/2021 To Be Determined Indra Medrano, [address]   8/26/2021 To Be Determined Indra Medrano, [address]   8/27/2021 To Be Determined Yaneli Haskins [address] [place] [address]   8/31/2021 To Be Determined Indra Medrano, [address]   9/1/2021 To Be Determined Yaneli Haskins [address] [place] [address]   9/2/2021 To Be Determined Allie Garcias, PT [address]   10/6/2021  9:00 AM MD DACIA YapMA BS AMB

## 2021-08-18 NOTE — HOME HEALTH
SUBJECTIVE: Patient and son report no to all COVID screening questions  CAREGIVER INVOLVEMENT/ASSISTANCE NEEDED FOR:Family to assist with all aspects of care needed  [address] ORDERED/DELIVERED THIS VISIT INCLUDE: Patient's son obtained glucometer  OBJECTIVE:  See interventions  ASSESSMENT OF PROGRESS TOWARD GOALS: New med added see Med review, Safety improved by return demonstration after instruction with consistent B foot clearance during gait with adjusted rollator 70' x 1 and for optimal hand placement for sit to/and from stand. CONTINUED NEED FOR THE FOLLOWING SKILLS:  Patient will be seen 2 x week for Physical Therapy to continue to work toward goals within POC and HHPT is medically necessary to address  dx and clinical findings: decreased ROM, decreased strength, impaired gait, decreased ability w stair negotiation, increased swelling, decreased bed mobility, decreased transfer status, decreased endurance, decreased balance and decreased safety, increased pain in order to improve functional mobility/quality of life, decrease burden of care, reduce risk for re-hospitalization, work towards patient's personal goals of return to PLOF w decrease risk for falls. PLAN FOR NEXT VISIT: Gait/transfer training, strengthening exercises, Bed mobility  THE FOLLOWING DISCHARGE PLANNING WAS DISCUSSED WITH THE PATIENT/CAREGIVER: This is visit number 4 out of 9 planned visits. Inda Brittle NEXT MD APPT:TBOSMIN

## 2021-08-19 ENCOUNTER — HOME CARE VISIT (OUTPATIENT)
Dept: SCHEDULING | Facility: HOME HEALTH | Age: 71
End: 2021-08-19
Payer: MEDICARE

## 2021-08-19 PROCEDURE — G0158 HHC OT ASSISTANT EA 15: HCPCS

## 2021-08-19 PROCEDURE — G0157 HHC PT ASSISTANT EA 15: HCPCS

## 2021-08-19 PROCEDURE — 3331090002 HH PPS REVENUE DEBIT

## 2021-08-19 PROCEDURE — 3331090001 HH PPS REVENUE CREDIT

## 2021-08-19 RX ORDER — METOPROLOL SUCCINATE 100 MG/1
100 TABLET, EXTENDED RELEASE ORAL DAILY
Qty: 90 TABLET | Refills: 0 | Status: SHIPPED | OUTPATIENT
Start: 2021-08-19

## 2021-08-19 RX ORDER — ERGOCALCIFEROL 1.25 MG/1
50000 CAPSULE ORAL
Qty: 12 CAPSULE | Refills: 1 | Status: SHIPPED | OUTPATIENT
Start: 2021-08-19

## 2021-08-19 NOTE — TELEPHONE ENCOUNTER
Mr Brenda Mccarthy called back about the medication the patient is out of. SHe states the pharmacy has been trying to get in conact w/ us since Monday but I didn't see any requests from them. He also states that she had went to [facility] on [address] on the 20th and they gave her amlodipine 5mg and her bp has been great on that medication. He states it's been around 140/84. Please advise.

## 2021-08-20 VITALS
HEART RATE: 92 BPM | DIASTOLIC BLOOD PRESSURE: 74 MMHG | OXYGEN SATURATION: 96 % | SYSTOLIC BLOOD PRESSURE: 138 MMHG | RESPIRATION RATE: 18 BRPM | TEMPERATURE: 98 F

## 2021-08-20 DIAGNOSIS — E78.2 MIXED HYPERLIPIDEMIA: ICD-10-CM

## 2021-08-20 PROCEDURE — 3331090002 HH PPS REVENUE DEBIT

## 2021-08-20 PROCEDURE — 3331090001 HH PPS REVENUE CREDIT

## 2021-08-20 NOTE — HOME HEALTH
SUBJECTIVE: Pts son answered door, pt in kitchen at 3 wheeled walker and had just finished making her own sandwich. Pt reported she has been bathing and dressing herslef the last week using the half bath downstairs in the home. CAREGIVER ASSISTANCE NEEDED FOR: pts son present during visit for education and training with pt safety and HEP. MEDICATIONS REVIEWED AND UPDATED: no medication changes reported this visit. Emily Nazario PATIENT/CAREGIVER EDUCATION PROVIDED THIS VISIT: Therapist educated pt on safety with footwear and longer dresses that could lead to tripping, she has been instructed to pickher feet up with each step so as not to shuffle and stand upright so clothing does not drag, pts son is also having some of her cloting hemmed to help with this. ASSESSMENT AND PROGRESS TOWARD GOALS: Pt and CG demonstrated good understanding of education provided and pt demonstrated improved mobility, balance and ADL execution with decreased v/c for safety and technique. Patient will benefit from continued intervention with progression of I/ADL and safe transfer training with continued energy conservation training. CONTINUED NEED FOR THE FOLLOWING SKILLS: HH OT is medically necessary to address decreased strength, impaired bed mobility, decreased independence with functional transfers, decreased I with I/ADLs, and impaired balance in order to improve functional independence, quality of life, return to PLOF, reduce the risk for falls, and reduce pain. PLAN: next visit will be for I/ADL and further transfer training with use of energy conservation strategies. DISCHARGE PLANNING DISCUSSED: Discharge to self and family under MD supervision once all goals have been met or patient has reached maximum potential.  Frequency remaininw2 remaining.

## 2021-08-20 NOTE — TELEPHONE ENCOUNTER
Patient called requesting medication refill, please advise    Requested Prescriptions     Pending Prescriptions Disp Refills    pravastatin (PRAVACHOL) 20 mg tablet 90 Tablet 0     Sig: Take 1 Tablet by mouth nightly.

## 2021-08-21 PROCEDURE — 3331090001 HH PPS REVENUE CREDIT

## 2021-08-21 PROCEDURE — 3331090002 HH PPS REVENUE DEBIT

## 2021-08-22 PROCEDURE — 3331090002 HH PPS REVENUE DEBIT

## 2021-08-22 PROCEDURE — 3331090001 HH PPS REVENUE CREDIT

## 2021-08-23 PROCEDURE — 3331090002 HH PPS REVENUE DEBIT

## 2021-08-23 PROCEDURE — 3331090001 HH PPS REVENUE CREDIT

## 2021-08-23 RX ORDER — PRAVASTATIN SODIUM 20 MG/1
20 TABLET ORAL
Qty: 90 TABLET | Refills: 1 | Status: SHIPPED | OUTPATIENT
Start: 2021-08-23

## 2021-08-23 NOTE — TELEPHONE ENCOUNTER
Last seen 8/6/21    Last filled 12/7/20 qty 90 w/ 1 refill    Future Appointments   Date Time Provider Department Center   8/24/2021 To Be Determined Mayra Haven, PTA [facility]   8/26/2021 To Be Determined Mayra Haven, PTA [facility]   8/26/2021 To Be Determined [place]ise Ochoa, [place] [address] [place] [address]   8/31/2021 To Be Determined Mayra Haven, PTA [address] [facility]   9/1/2021 To Be Determined Cliff Given [address] [place] [address]   9/2/2021 To Be Determined Rodgeria Seats, PT [address] [facility]   10/6/2021  9:00 AM MD MARK Cao BS AMB

## 2021-08-24 ENCOUNTER — HOME CARE VISIT (OUTPATIENT)
Dept: HOME HEALTH SERVICES | Facility: HOME HEALTH | Age: 71
End: 2021-08-24
Payer: MEDICARE

## 2021-08-24 DIAGNOSIS — E11.21 TYPE 2 DIABETES WITH NEPHROPATHY (HCC): ICD-10-CM

## 2021-08-24 PROCEDURE — 3331090002 HH PPS REVENUE DEBIT

## 2021-08-24 PROCEDURE — 3331090001 HH PPS REVENUE CREDIT

## 2021-08-24 RX ORDER — METFORMIN HYDROCHLORIDE 500 MG/1
500 TABLET ORAL 2 TIMES DAILY WITH MEALS
Qty: 180 TABLET | Refills: 1 | Status: SHIPPED | OUTPATIENT
Start: 2021-08-24

## 2021-08-24 NOTE — TELEPHONE ENCOUNTER
Last seen 8/6/21    Last filled 12/7/20 qty 180 w/ 0 refills     Future Appointments   Date Time Provider Department Center   8/26/2021 To Be Determined John Vargas, PTA [address] [facility]   8/26/2021 To Be Determined [place]esha Cruz, [place] [address] [place] [address]   8/31/2021 To Be Determined John Vargas, PTA [address] [facility]   9/1/2021 To Be Determined Chris Burgos [address] [place] [address]   9/2/2021 To Be Determined Carlos Julian, PT [facility]   10/6/2021  9:00 AM Dyan Li MD BSMA BS AMB

## 2021-08-25 PROCEDURE — 3331090002 HH PPS REVENUE DEBIT

## 2021-08-25 PROCEDURE — 3331090001 HH PPS REVENUE CREDIT

## 2021-08-26 ENCOUNTER — TELEPHONE (OUTPATIENT)
Dept: FAMILY MEDICINE CLINIC | Age: 71
End: 2021-08-26

## 2021-08-26 ENCOUNTER — HOME CARE VISIT (OUTPATIENT)
Dept: SCHEDULING | Facility: HOME HEALTH | Age: 71
End: 2021-08-26
Payer: MEDICARE

## 2021-08-26 PROCEDURE — 3331090001 HH PPS REVENUE CREDIT

## 2021-08-26 PROCEDURE — 3331090002 HH PPS REVENUE DEBIT

## 2021-08-26 PROCEDURE — G0152 HHCP-SERV OF OT,EA 15 MIN: HCPCS

## 2021-08-26 NOTE — TELEPHONE ENCOUNTER
Grandson called states patient went to ER yesterday and was diagnose with COVID and aneurysm on chest. ER told him to contact PCP. Please advise. ..

## 2021-08-27 ENCOUNTER — HOME CARE VISIT (OUTPATIENT)
Dept: SCHEDULING | Facility: HOME HEALTH | Age: 71
End: 2021-08-27
Payer: MEDICARE

## 2021-08-27 PROCEDURE — 3331090001 HH PPS REVENUE CREDIT

## 2021-08-27 PROCEDURE — 3331090002 HH PPS REVENUE DEBIT

## 2021-08-27 PROCEDURE — G0157 HHC PT ASSISTANT EA 15: HCPCS

## 2021-08-27 NOTE — HOME HEALTH
Caregiver involvement: Puja Galicia (son) lives locally and provides pt with daily emotional support. He is present today to assist pt with s/u of ADL tasks and safety with mobility. Medications reconciled and all medications are available in the home this visit. The following education was provided regarding medications, medication interactions, and look a like medications: Continue as directed by MD.  Medications  are effective at this time. Home health supplies by type and quantity ordered/delivered this visit include: na    Patient education provided this visit:  Reviewed energy conservation strategies to maximize pt's safety with ADL tasks. Progress toward goals: Ms. Kristy Davis is progressing towards goals despite recent diagnosis of COVID. Her progress has slowed due to diagnosis and will require a few additional visits to maximize potential.  Order request sent to MD.    Home exercise program: pt reports daily compliance with UB HEP in home and denies having any questions. Encouraged to perform daily. Continued need for the following skills: Nursing, Physical Therapy and Occupational Therapy    The following discharge planning was discussed with the pt/caregiver: 1w1 remaining. Requested extension of 1w3 to assist pt with return to independence with ADL, IADL and mobility. Current delay in progress towards goals due to recent diagnosis of COVID. Awaiting new orders.

## 2021-08-28 PROCEDURE — 3331090002 HH PPS REVENUE DEBIT

## 2021-08-28 PROCEDURE — 3331090001 HH PPS REVENUE CREDIT

## 2021-08-29 PROCEDURE — 3331090001 HH PPS REVENUE CREDIT

## 2021-08-29 PROCEDURE — 3331090002 HH PPS REVENUE DEBIT

## 2021-08-30 VITALS
TEMPERATURE: 97.2 F | DIASTOLIC BLOOD PRESSURE: 88 MMHG | SYSTOLIC BLOOD PRESSURE: 160 MMHG | OXYGEN SATURATION: 96 % | HEART RATE: 66 BPM

## 2021-08-30 PROCEDURE — 3331090001 HH PPS REVENUE CREDIT

## 2021-08-30 PROCEDURE — 3331090002 HH PPS REVENUE DEBIT

## 2021-08-30 NOTE — TELEPHONE ENCOUNTER
Spoke to grandson patient was seen at [facility] on [address] on 8/23/21 and dx w/ COVID they also stated that the xray of showed a possible aneurysm. Called over and request report to be faxed over as well as scheduled a follow up after quarantine.      Future Appointments   Date Time Provider Patricia Waldron   9/16/2021  2:15 PM MD DACIA AponteMA BS AMB   10/6/2021  9:00 AM Sara Hauser MD BSMA BS AMB

## 2021-08-31 ENCOUNTER — HOME CARE VISIT (OUTPATIENT)
Dept: SCHEDULING | Facility: HOME HEALTH | Age: 71
End: 2021-08-31
Payer: MEDICARE

## 2021-08-31 VITALS
DIASTOLIC BLOOD PRESSURE: 70 MMHG | SYSTOLIC BLOOD PRESSURE: 110 MMHG | OXYGEN SATURATION: 96 % | RESPIRATION RATE: 18 BRPM | HEART RATE: 72 BPM | TEMPERATURE: 98.2 F

## 2021-08-31 VITALS
SYSTOLIC BLOOD PRESSURE: 150 MMHG | TEMPERATURE: 98.2 F | HEART RATE: 85 BPM | DIASTOLIC BLOOD PRESSURE: 100 MMHG | RESPIRATION RATE: 16 BRPM | OXYGEN SATURATION: 93 %

## 2021-08-31 VITALS
SYSTOLIC BLOOD PRESSURE: 128 MMHG | DIASTOLIC BLOOD PRESSURE: 82 MMHG | OXYGEN SATURATION: 96 % | RESPIRATION RATE: 15 BRPM | HEART RATE: 82 BPM | TEMPERATURE: 96.8 F

## 2021-08-31 PROCEDURE — 3331090002 HH PPS REVENUE DEBIT

## 2021-08-31 PROCEDURE — G0158 HHC OT ASSISTANT EA 15: HCPCS

## 2021-08-31 PROCEDURE — G0151 HHCP-SERV OF PT,EA 15 MIN: HCPCS

## 2021-08-31 PROCEDURE — 3331090001 HH PPS REVENUE CREDIT

## 2021-08-31 NOTE — HOME HEALTH
SUBJECTIVE: Patient and son report Patient began coughing and son took patient to MD for CVOID test and is came back Positive. CAREGIVER INVOLVEMENT/ASSISTANCE NEEDED FOR:Family to assist with all aspects of care needed         [address] ORDERED/DELIVERED THIS VISIT INCLUDE:          OBJECTIVE:  See interventions         ASSESSMENT OF PROGRESS TOWARD GOALS:  Improved tolerance of gait within home x 8 min Sup with focus on B foot clearance-feel mosre supportive shoes would make task easier for patient rather than open sandals. Patient able to sit to stand on first attempt. Good tolerance of activity this visit with O2 SATS remaining in the 90s. CONTINUED NEED FOR THE FOLLOWING SKILLS:  Patient will be seen 2 x week for Physical Therapy to continue to work toward goals within POC and HHPT is medically necessary to address  dx and clinical findings: decreased ROM, decreased strength, impaired gait, decreased ability w stair negotiation, increased swelling, decreased bed mobility, decreased transfer status, decreased endurance, decreased balance and decreased safety, increased pain in order to improve functional mobility/quality of life, decrease burden of care, reduce risk for re-hospitalization, work towards patient's personal goals of return to PLOF w decrease risk for falls. PLAN FOR NEXT VISIT: Reassessment from Supervising Physical therapist         THE FOLLOWING DISCHARGE PLANNING WAS DISCUSSED WITH THE PATIENT/CAREGIVER: This is visit number 5 out of 9 planned visits. Discussed upcoming discharge and feel patient may need extension of services to continue to work on overall goals-will discuss at reassessment next visit.          NEXT MD APPT:NORMA

## 2021-08-31 NOTE — HOME HEALTH
SUBJECTIVE: Patient and son report no to all COVID screening questions    CAREGIVER INVOLVEMENT/ASSISTANCE NEEDED FOR:Family to assist with all aspects of care needed    [address] ORDERED/DELIVERED THIS VISIT INCLUDE:     OBJECTIVE:  See interventions    ASSESSMENT OF PROGRESS TOWARD GOALS: Working to improve overall consistency with B foot clearance-instructed in strategy of  B heel stike to increase B foot clearance-instructed patient to wear more supportive shoe-she reports she has them but must find them. Progression of strengthening exercises within COPD/CHF program -cues needed throughout and addition of exercises-working toward completion of all program exercises and increasing repetitions to imporve overall strength and endurance to retrun to prior level of functional independence. CONTINUED NEED FOR THE FOLLOWING SKILLS:  Patient will be seen 2 x week for Physical Therapy to continue to work toward goals within POC and HHPT is medically necessary to address  dx and clinical findings: decreased ROM, decreased strength, impaired gait, decreased ability w stair negotiation, increased swelling, decreased bed mobility, decreased transfer status, decreased endurance, decreased balance and decreased safety, increased pain in order to improve functional mobility/quality of life, decrease burden of care, reduce risk for re-hospitalization, work towards patient's personal goals of return to PLOF w decrease risk for falls. PLAN FOR NEXT VISIT: Gait/transfer training, strengthening exercises, Bed mobility    THE FOLLOWING DISCHARGE PLANNING WAS DISCUSSED WITH THE PATIENT/CAREGIVER: This is visit number 5 out of 9 planned visits. Inda Brittle NEXT MD APPT:NORMA

## 2021-09-01 VITALS
RESPIRATION RATE: 18 BRPM | TEMPERATURE: 96.3 F | OXYGEN SATURATION: 98 % | DIASTOLIC BLOOD PRESSURE: 84 MMHG | HEART RATE: 86 BPM | SYSTOLIC BLOOD PRESSURE: 138 MMHG

## 2021-09-01 PROCEDURE — 3331090002 HH PPS REVENUE DEBIT

## 2021-09-01 PROCEDURE — 3331090001 HH PPS REVENUE CREDIT

## 2021-09-01 NOTE — HOME HEALTH
S: patient was recently diagnosed with COVID and is being treated with full PPE    patient reports that she is doing well - minimal shortness of breath   O: PAIN: denies pain   WOUND:no wounds noted or reported   ROM: B LE hip flexion, abduction and adduction WFL   B knee flexion/extension WFL   STRENGTH: R knee ext 5/5, R knee flexion 5/5, R hip flex 4/5, R hip abd/adduction 5/5  L knee flexion and extension 5/5, L hip flexion 4/5 abduction and adduction 5/5  BED MOBILITY:patient demonstrated ability to get in and out of the bed with independence - her bed is slightly high and she plans to get a small stool to assist her with getting in and out of th bed - but she was able to use her scooting all the way back and then complete supine to sit and sit to supine independently   EQUIPMENT: 3 wheeled walker  TRANSFERS: able to complete sit to stand without UE push off from the bed and the recliner chair   GAIT: ambulating with 3 wheeled walker in the home with supervision with symmetrical gait pattern and full foot clearance while wearing supportive shoes. She was able to navigate the home and maneuver the walker with good safety   STEPS: NA   BALANCE: tinetti 11/28 at evaluation  tinetti 24/28 at reassessment   greater than a 4 point increase in the tinetti score indicates a statistically significant reduction in fall risk    A:ASSESSMENT AND PROGRESS TOWARD GOALS:  Patient demonstrated a positive result to therapy this date as evidenced by an improvement in gait pattern with cues, no pain reported, and patient expressing an understanding of the rehab plan due to therapy verbal and written instructions. Goals reviewed and updated as needed  for increased independence in the home, safe mobility in the home, improvement in strength and ROM - all designed to reduce fall risk and progress toward independence.   Patient will benefit from continued PT intervention to progress toward meeting all established goals    P:.continue with progression of mobility, ther ex for strength, provided education to patient and caregivers to maximize his recovery and reduce the fall risk to progress toward a return to independent function      PMH/Summary of clinical condition: per epic \" Anxiety      Arthritis      Diabetes ([address]      Hypercholesterolemia      Hypertension\"    Medications review completed. medications are effective at this time    social history/ caregivers:patient is currently living with her son in a 2 story house with all of her living on the first floor. Her son is the main caregiver and assists wtih meals, medications, ADLS, mobility and transportation     Discharge planning discussed with patient and caregiver. Discharge planning as follows: DC to self and family under MD supervision when all goals are met or maximum potential is reached  Pt/Caregiver did verbalize understanding. Patient education provided this visit: safety, HEP, deep breathing, fall risk, plan for therapy     Home exercise program: COPD/CHF program 1 x 10 reps-1-2 x daily   to amb several times daily and sit to stand every waking hour    Continued need for the following skills: MD referral for HHPT following recent hospital stay. HHPT is medically necessary to address  dx and clinical findings: decreased ROM, decreased strength, impaired gait, decreased ability w stair negotiation, increased swelling, decreased bed mobility, decreased w/c mobility, decreased transfer status, decreased endurance, decreased balance and decreased safety, increased pain in order to improve functional mobility/quality of life, decrease burden of care, reduce risk for re-hospitalization, work towards patient's personal goals of return to PLOF w decrease risk for falls.

## 2021-09-01 NOTE — HOME HEALTH
*Pt COVID positive, full PPE worn each visit through 9/3/21*  . SUBJECTIVE: Pt seated in recliner upon arrival, pt was about to put on different shoes instead of sandals as recommended. Pt reported no pain today and no c/o of increased SOB. CAREGIVER ASSISTANCE NEEDED FOR: pts son present during visit for education with fatigue related to COVID-19 diagnosis. MEDICATIONS REVIEWED AND UPDATED: no medication changes reported this visit  . PATIENT/CAREGIVER EDUCATION PROVIDED THIS VISIT: Therapist educated pts son on use of bed rail to assist with bed mobility, pt thinks she has one but son is unsure and is going to look for it, will order if they cannot find. ASSESSMENT AND PROGRESS TOWARD GOALS:  Pt demonstrated good progress towards goals for increased safety and I with transfers, self-care and endurance with use of proper AD. Patient will benefit from continued intervention with progression of I/ADL, balance and endurance training to increase safety and I in home environment. CONTINUED NEED FOR THE FOLLOWING SKILLS: HH OT is medically necessary to address pain, decreased ROM, decreased strength, impaired bed mobility, decreased independence with functional transfers, decreased I with I/ADLs, and impaired balance in order to improve functional independence, quality of life, return to PLOF, reduce the risk for falls, and reduce pain. PLAN: next visit will be for IADL training with use of energy conservation techniques for light meal prep at walker level. DISCHARGE PLANNING DISCUSSED: Discharge to self and family under MD supervision once all goals have been met or patient has reached maximum potential.  Frequency remaininw3 remaining.

## 2021-09-02 ENCOUNTER — HOME CARE VISIT (OUTPATIENT)
Dept: HOME HEALTH SERVICES | Facility: HOME HEALTH | Age: 71
End: 2021-09-02
Payer: MEDICARE

## 2021-09-02 ENCOUNTER — HOME CARE VISIT (OUTPATIENT)
Dept: SCHEDULING | Facility: HOME HEALTH | Age: 71
End: 2021-09-02
Payer: MEDICARE

## 2021-09-02 VITALS
TEMPERATURE: 97.4 F | HEART RATE: 68 BPM | SYSTOLIC BLOOD PRESSURE: 134 MMHG | RESPIRATION RATE: 18 BRPM | DIASTOLIC BLOOD PRESSURE: 87 MMHG

## 2021-09-02 PROCEDURE — G0157 HHC PT ASSISTANT EA 15: HCPCS

## 2021-09-02 PROCEDURE — 3331090001 HH PPS REVENUE CREDIT

## 2021-09-02 PROCEDURE — G0158 HHC OT ASSISTANT EA 15: HCPCS

## 2021-09-02 PROCEDURE — 3331090002 HH PPS REVENUE DEBIT

## 2021-09-02 NOTE — HOME HEALTH
SUBJECTIVE: Pt in living room upon arrival, PTA present for car transfer co-tx for pt safety to exit and re-enter home  CAREGIVER ASSISTANCE NEEDED FOR: Pts son home for education and demo of pt progress with safety and I.  MEDICATIONS REVIEWED AND UPDATED: no medication changes reported this visit  . ASSESSMENT AND PROGRESS TOWARD GOALS:  Pt demonstrated increased I with meal prep, functional transfers in/out of home and car with no physical assistance and min v/c for safety technique. Patient will benefit from continued intervention with progression of ADL and endurance training. CONTINUED NEED FOR THE FOLLOWING SKILLS: HH OT is medically necessary to address decreased strength, decreased independence with functional transfers, decreased I with I/ADLs, and impaired balance in order to improve functional independence, quality of life, return to PLOF, reduce the risk for falls, and reduce pain. PLAN: next visit will be for final ADL assessment for carry over of energy conservation techniques. DISCHARGE PLANNING DISCUSSED: Discharge to self and family under MD supervision once all goals have been met or patient has reached maximum potential.  Frequency remaininw2 remaining.

## 2021-09-03 PROCEDURE — 3331090001 HH PPS REVENUE CREDIT

## 2021-09-03 PROCEDURE — 3331090002 HH PPS REVENUE DEBIT

## 2021-09-04 PROCEDURE — 3331090002 HH PPS REVENUE DEBIT

## 2021-09-04 PROCEDURE — 3331090001 HH PPS REVENUE CREDIT

## 2021-09-05 PROCEDURE — 3331090002 HH PPS REVENUE DEBIT

## 2021-09-05 PROCEDURE — 3331090001 HH PPS REVENUE CREDIT

## 2021-09-06 PROCEDURE — 3331090001 HH PPS REVENUE CREDIT

## 2021-09-06 PROCEDURE — 3331090002 HH PPS REVENUE DEBIT

## 2021-09-07 ENCOUNTER — HOME CARE VISIT (OUTPATIENT)
Dept: SCHEDULING | Facility: HOME HEALTH | Age: 71
End: 2021-09-07
Payer: MEDICARE

## 2021-09-07 PROCEDURE — 400018 HH-NO PAY CLAIM PROCEDURE

## 2021-09-07 PROCEDURE — 3331090002 HH PPS REVENUE DEBIT

## 2021-09-07 PROCEDURE — 3331090001 HH PPS REVENUE CREDIT

## 2021-09-07 PROCEDURE — 400013 HH SOC

## 2021-09-07 PROCEDURE — G0157 HHC PT ASSISTANT EA 15: HCPCS

## 2021-09-08 ENCOUNTER — HOME CARE VISIT (OUTPATIENT)
Dept: SCHEDULING | Facility: HOME HEALTH | Age: 71
End: 2021-09-08
Payer: MEDICARE

## 2021-09-08 VITALS
RESPIRATION RATE: 18 BRPM | TEMPERATURE: 97.5 F | HEART RATE: 88 BPM | SYSTOLIC BLOOD PRESSURE: 140 MMHG | DIASTOLIC BLOOD PRESSURE: 82 MMHG | OXYGEN SATURATION: 95 %

## 2021-09-08 VITALS
OXYGEN SATURATION: 98 % | RESPIRATION RATE: 15 BRPM | TEMPERATURE: 96.7 F | DIASTOLIC BLOOD PRESSURE: 88 MMHG | SYSTOLIC BLOOD PRESSURE: 148 MMHG | HEART RATE: 61 BPM

## 2021-09-08 VITALS
SYSTOLIC BLOOD PRESSURE: 134 MMHG | DIASTOLIC BLOOD PRESSURE: 72 MMHG | TEMPERATURE: 96.4 F | DIASTOLIC BLOOD PRESSURE: 87 MMHG | OXYGEN SATURATION: 98 % | HEART RATE: 72 BPM | RESPIRATION RATE: 18 BRPM | SYSTOLIC BLOOD PRESSURE: 134 MMHG | TEMPERATURE: 97.4 F | RESPIRATION RATE: 18 BRPM | HEART RATE: 68 BPM

## 2021-09-08 PROCEDURE — G0151 HHCP-SERV OF PT,EA 15 MIN: HCPCS

## 2021-09-08 PROCEDURE — 3331090002 HH PPS REVENUE DEBIT

## 2021-09-08 PROCEDURE — G0158 HHC OT ASSISTANT EA 15: HCPCS

## 2021-09-08 PROCEDURE — 3331090001 HH PPS REVENUE CREDIT

## 2021-09-08 NOTE — HOME HEALTH
SUBJECTIVE: Patient reports she is feeling \"so much better. \"     CAREGIVER INVOLVEMENT/ASSISTANCE NEEDED FOR: Son has returned to work-patient able to make small meals, son sets up meds and provides transportation      [address] ORDERED/DELIVERED THIS VISIT INCLUDE: None    OBJECTIVE:  See interventions      ASSESSMENT OF PROGRESS TOWARD GOALS: Patient only required intial reminder for locking brake when steppping down or up 2 steps to enter/egress home, Mod I for gait with occasional cue to not rest her forearm on the walker hand rest.  Goal for safe ambulation partially met    CONTINUED NEED FOR THE FOLLOWING SKILLS:  Patient will be seen 2 x week for Physical Therapy to continue to work toward goals within POC and HHPT is medically necessary to address  dx and clinical findings: decreased strength, impaired gait, decreased ability w stair negotiation, increased swelling, , decreased endurance, decreased balance and decreased safety, increased pain in order to improve functional mobility/quality of life, decrease burden of care, reduce risk for re-hospitalization, work towards patient's personal goals of return to PLOF w decrease risk for falls. PLAN FOR NEXT VISIT: Discharge assessment from Supervising Physical Therapist including TUG score     THE FOLLOWING DISCHARGE PLANNING WAS DISCUSSED WITH THE PATIENT/CAREGIVER: This is visit number 9 out of 11 planned visits. Received extension from MD for this week-discussed discharge and patient is in agreement planning to continue with HEP with increasing reps slowly to 30.     NEXT MD APPT:NORMA

## 2021-09-08 NOTE — Clinical Note
Patient was seen for home care physical therapy for hypertension, diabetes and morbid obesity. During the course of treatment she developed COVID infection. She is doing well and is now off of COVID precautions. She has minimal COVID symptoms. She is ambulating with a 3 wheeled walker with modified independence on even and uneven surfaces. She is independent with transfers to and from the toilet, bed, and couch/chair. She needs occasional cues to hold the walker with her hands and not put her forearms on the walker when she fatigues. She is able to egress and enter the house with use of the walker independently. She continues to deny pain throughout all sessions. She continues to get short of breath with moderate exertion but is able to recover quickly with deep breathing. Her tinetti balance score ha improved from an 11/28 to a 24/28 with a greater than a 4 point increase in the tinetti score indicating a statistically significant reduction in fall risk. She has been educated on a HEP for strength, endurance and breath control and has demonstrated good technique and follow through. She is ready for DC from home care PT at this time.

## 2021-09-08 NOTE — HOME HEALTH
S: patient reports no symptoms of her previous COVID infection  O: PAIN: denies pain   WOUND:no wounds noted or reported   ROM: B LE hip flexion, abduction and adduction WFL   B knee flexion/extension WFL   STRENGTH: R knee ext 5/5, R knee flexion 5/5, R hip flex 4+/5, R hip abd/adduction 5/5  L knee flexion and extension 5/5, L hip flexion 4+/5 abduction and adduction 5/5  BED MOBILITY:independent with bed mobility but continues to sleep in the recliner by choice  EQUIPMENT: 3 wheeled walker  TRANSFERS: independent with all transfers  GAIT: ambulating with a 3 wheeled walker with modified independence on even and uneven surfaces. She needs occasional cues to hold the walker with her hands and not put her forearms on the walker when she fatigues. STEPS:  able to egress and enter the house with use of the walker independently. BALANCE: tinetti 11/28 at evaluation  tinetti 24/28 at reassessment and discharge  greater than a 4 point increase in the tinetti score indicates a statistically significant reduction in fall risk    A:ASSESSMENT AND PROGRESS TOWARD GOALS:  Patient was seen for home care physical therapy for hypertension, diabetes and morbid obesity. During the course of treatment she developed COVID infection. She is doing well and is now off of COVID precautions. She has minimal COVID symptoms. She is ambulating with a 3 wheeled walker with modified independence on even and uneven surfaces. She is independent with transfers to and from the toilet, bed, and couch/chair. She needs occasional cues to hold the walker with her hands and not put her forearms on the walker when she fatigues. She is able to egress and enter the house with use of the walker independently. She continues to deny pain throughout all sessions. She continues to get short of breath with moderate exertion but is able to recover quickly with deep breathing.  Her tinetti balance score ha improved from an 11/28 to a 24/28 with a greater than a 4 point increase in the tinetti score indicating a statistically significant reduction in fall risk. She has been educated on a HEP for strength, endurance and breath control and has demonstrated good technique and follow through. She is ready for DC from home care PT at this time. P: DC PT          PMH/Summary of clinical condition: per epic \" Anxiety      Arthritis      Diabetes ([address]      Hypercholesterolemia      Hypertension\"    Medications review completed. medications are effective at this time    social history/ caregivers:patient is currently living with her son in a 2 story house with all of her living on the first floor. Her son is the main caregiver and assists [facility] meals, medications, ADLS, mobility and transportation     Discharge planning discussed with patient and caregiver.   Discharge planning as follows: DC to self and family under MD supervision today  Patient education provided this visit: safety, HEP, deep breathing, fall risk, plan for therapy     Home exercise program: COPD/CHF program 1 x 10 reps-1-2 x daily   to amb several times daily and sit to stand every waking hour

## 2021-09-08 NOTE — HOME HEALTH
SUBJECTIVE: Patient reports she is feeling \"so much better. \"     CAREGIVER INVOLVEMENT/ASSISTANCE NEEDED FOR: Son has returned to work-patient able to make small meals, son sets up meds and provides transportation    [address] ORDERED/DELIVERED THIS VISIT INCLUDE: None    OBJECTIVE:  See interventions    ASSESSMENT OF PROGRESS TOWARD GOALS: Transfer training for car transfer-Independent after instruction by return demonstration. Supervision for cues to lock rollator before stepping up  or down the 2 steps to enter/egress home with amb outside. CONTINUED NEED FOR THE FOLLOWING SKILLS:  Patient will be seen 2 x week for Physical Therapy to continue to work toward goals within POC and HHPT is medically necessary to address  dx and clinical findings: decreased strength, impaired gait, decreased ability w stair negotiation, increased swelling, , decreased endurance, decreased balance and decreased safety, increased pain in order to improve functional mobility/quality of life, decrease burden of care, reduce risk for re-hospitalization, work towards patient's personal goals of return to PLOF w decrease risk for falls. PLAN FOR NEXT VISIT: Gait training on uneven surfaces including steps to enter/egress home     THE FOLLOWING DISCHARGE PLANNING WAS DISCUSSED WITH THE PATIENT/CAREGIVER: This is visit number 8 out of 9 planned visits. Requested exension order for 2 wk 1 to continue to work toward goals.     NEXT MD APPT:NORMA

## 2021-09-08 NOTE — HOME HEALTH
SUBJECTIVE: Pt home alone today and answered door at walker level on her own. Pt prepared her own lunch today of a turket sandwich with no help. MEDICATIONS REVIEWED AND UPDATED: no medication changes reported this visit  . PATIENT/CAREGIVER EDUCATION PROVIDED THIS VISIT: Therapist educated pt on importance of continuing with HEP and upgrades as educated, utilizing energy conservation strategies and staying actie to keep up her endurance, pt verbalized understanding. ASSESSMENT AND PROGRESS TOWARD GOALS:  Pt has demonstrated good progress towards goals for increased safety and I with self-care, meal prep, transfers and endurance to improve overall safety in home environment with decreased assistance and v/c. PLAN: next visit will be for OTDC, pt on track to reach goals for HHOT at this time. DISCHARGE PLANNING DISCUSSED: Discharge to self and family under MD supervision once all goals have been met or patient has reached maximum potential.  Frequency remaininw1 remaining with pt aware of OTDC next week.

## 2021-09-09 PROCEDURE — 3331090002 HH PPS REVENUE DEBIT

## 2021-09-09 PROCEDURE — 3331090001 HH PPS REVENUE CREDIT

## 2021-09-10 PROCEDURE — 3331090001 HH PPS REVENUE CREDIT

## 2021-09-10 PROCEDURE — 3331090002 HH PPS REVENUE DEBIT

## 2021-09-11 PROCEDURE — 3331090002 HH PPS REVENUE DEBIT

## 2021-09-11 PROCEDURE — 3331090001 HH PPS REVENUE CREDIT

## 2021-09-12 PROCEDURE — 3331090001 HH PPS REVENUE CREDIT

## 2021-09-12 PROCEDURE — 3331090002 HH PPS REVENUE DEBIT

## 2021-09-13 ENCOUNTER — HOME CARE VISIT (OUTPATIENT)
Dept: SCHEDULING | Facility: HOME HEALTH | Age: 71
End: 2021-09-13
Payer: MEDICARE

## 2021-09-13 PROCEDURE — 3331090002 HH PPS REVENUE DEBIT

## 2021-09-13 PROCEDURE — G0152 HHCP-SERV OF OT,EA 15 MIN: HCPCS

## 2021-09-13 PROCEDURE — 3331090001 HH PPS REVENUE CREDIT

## 2021-09-13 NOTE — Clinical Note
Occupational Therapy Discharge -  Ms. Tramaine Nguyen was seen by skilled OT for 7 visits to maximize her safety and participation with self care and functional mobility in her home. Ms. Tramaine Nguyen has returned to independence with self care to include grooming, sponge bathing, dressing and toileting. She has been educated on energy conservation strategies and uses them with ADL and IADL tasks. She keeps frequently used items within reach/counter height and sits for prolonged tasks such as grooming, bathing, dressing and meal prep. She is independent with transfers in the home using a rollator. She prefers to stay on the first floor at this time and declines to address tub/shower transfer on second floor. Maximal potential has been achieved with self care and functional mobility in the home at this time. No further skilled OT is indicated. MD office notified of agency discharge. Physical Therapy Discharge note taken 9/8/21:  Patient was seen for home care physical therapy for hypertension, diabetes and morbid obesity. During the course of treatment she developed COVID infection. She is doing well and is now off of COVID precautions. She has minimal COVID symptoms. She is ambulating with a 3 wheeled walker with modified independence on even and uneven surfaces. She is independent with transfers to and from the toilet, bed, and couch/chair. She needs occasional cues to hold the walker with her hands and not put her forearms on the walker when she fatigues. She is able to egress and enter the house with use of the walker independently. She continues to deny pain throughout all sessions. She continues to get short of breath with moderate exertion but is able to recover quickly with deep breathing. Her tinetti balance score ha improved from an 11/28 to a 24/28 with a greater than a 4 point increase in the tinetti score indicating a statistically significant reduction in fall risk.  She has been educated on a HEP for strength, endurance and breath control and has demonstrated good technique and follow through. She is ready for DC from home care PT at this time.

## 2021-09-14 VITALS
TEMPERATURE: 97.9 F | SYSTOLIC BLOOD PRESSURE: 138 MMHG | OXYGEN SATURATION: 96 % | HEART RATE: 86 BPM | DIASTOLIC BLOOD PRESSURE: 88 MMHG

## 2021-09-14 NOTE — HOME HEALTH
Caregiver involvement: Rocky Titus (son) lives locally and checks on pt weekly for emotional support. Medications reconciled and all medications are available in the home this visit. The following education was provided regarding medications, medication interactions, and look a like medications: Continue as directed by MD.  Medications  are effective at this time. Home health supplies by type and quantity ordered/delivered this visit include: na    Patient education provided this visit:  Reviewed use of energy conservation with ADL and IADL tasks. Pt reports daily compliance and independence with all daily tasks. Progress toward goals: all goals met. Ms. Ruperto Neves has returned to independence with ADL, mobility and IADL tasks in her home. Home exercise program: indpendent with handout. Reviewed HEP. Pt denies having any questions. Continued need for the following skills: agency discharge    The following discharge planning was discussed with the pt/caregiver: DC OT. Ms. Ruperto Neves has met all goals and reached maximal potential with self care and functional mobility in her home. She has returned to her PLOF of I. No further OT needed. MD office notified of agency discharge.

## 2021-09-16 ENCOUNTER — OFFICE VISIT (OUTPATIENT)
Dept: FAMILY MEDICINE CLINIC | Age: 71
End: 2021-09-16
Payer: MEDICARE

## 2021-09-16 VITALS
OXYGEN SATURATION: 97 % | RESPIRATION RATE: 18 BRPM | SYSTOLIC BLOOD PRESSURE: 132 MMHG | HEIGHT: 61 IN | HEART RATE: 66 BPM | WEIGHT: 248 LBS | DIASTOLIC BLOOD PRESSURE: 86 MMHG | BODY MASS INDEX: 46.82 KG/M2 | TEMPERATURE: 97.3 F

## 2021-09-16 DIAGNOSIS — E11.21 TYPE 2 DIABETES WITH NEPHROPATHY (HCC): ICD-10-CM

## 2021-09-16 DIAGNOSIS — Z23 NEEDS FLU SHOT: Primary | ICD-10-CM

## 2021-09-16 DIAGNOSIS — R22.41 MASS OF RIGHT THIGH: ICD-10-CM

## 2021-09-16 DIAGNOSIS — R60.0 LOWER LEG EDEMA: ICD-10-CM

## 2021-09-16 DIAGNOSIS — R93.89 ABNORMAL CXR: ICD-10-CM

## 2021-09-16 DIAGNOSIS — E66.01 MORBID OBESITY WITH BMI OF 50.0-59.9, ADULT (HCC): ICD-10-CM

## 2021-09-16 DIAGNOSIS — E11.9 CONTROLLED TYPE 2 DIABETES MELLITUS WITHOUT COMPLICATION, WITHOUT LONG-TERM CURRENT USE OF INSULIN (HCC): ICD-10-CM

## 2021-09-16 DIAGNOSIS — E78.2 MIXED HYPERLIPIDEMIA: ICD-10-CM

## 2021-09-16 DIAGNOSIS — I10 ESSENTIAL HYPERTENSION: ICD-10-CM

## 2021-09-16 DIAGNOSIS — F41.9 ANXIETY: ICD-10-CM

## 2021-09-16 PROCEDURE — G0008 ADMIN INFLUENZA VIRUS VAC: HCPCS | Performed by: LEGAL MEDICINE

## 2021-09-16 PROCEDURE — G8536 NO DOC ELDER MAL SCRN: HCPCS | Performed by: LEGAL MEDICINE

## 2021-09-16 PROCEDURE — G8417 CALC BMI ABV UP PARAM F/U: HCPCS | Performed by: LEGAL MEDICINE

## 2021-09-16 PROCEDURE — G8427 DOCREV CUR MEDS BY ELIG CLIN: HCPCS | Performed by: LEGAL MEDICINE

## 2021-09-16 PROCEDURE — G8400 PT W/DXA NO RESULTS DOC: HCPCS | Performed by: LEGAL MEDICINE

## 2021-09-16 PROCEDURE — 1090F PRES/ABSN URINE INCON ASSESS: CPT | Performed by: LEGAL MEDICINE

## 2021-09-16 PROCEDURE — G9899 SCRN MAM PERF RSLTS DOC: HCPCS | Performed by: LEGAL MEDICINE

## 2021-09-16 PROCEDURE — 1101F PT FALLS ASSESS-DOCD LE1/YR: CPT | Performed by: LEGAL MEDICINE

## 2021-09-16 PROCEDURE — 99214 OFFICE O/P EST MOD 30 MIN: CPT | Performed by: LEGAL MEDICINE

## 2021-09-16 PROCEDURE — 2022F DILAT RTA XM EVC RTNOPTHY: CPT | Performed by: LEGAL MEDICINE

## 2021-09-16 PROCEDURE — G8754 DIAS BP LESS 90: HCPCS | Performed by: LEGAL MEDICINE

## 2021-09-16 PROCEDURE — G8510 SCR DEP NEG, NO PLAN REQD: HCPCS | Performed by: LEGAL MEDICINE

## 2021-09-16 PROCEDURE — 3017F COLORECTAL CA SCREEN DOC REV: CPT | Performed by: LEGAL MEDICINE

## 2021-09-16 PROCEDURE — G8752 SYS BP LESS 140: HCPCS | Performed by: LEGAL MEDICINE

## 2021-09-16 PROCEDURE — 3044F HG A1C LEVEL LT 7.0%: CPT | Performed by: LEGAL MEDICINE

## 2021-09-16 PROCEDURE — 90694 VACC AIIV4 NO PRSRV 0.5ML IM: CPT | Performed by: LEGAL MEDICINE

## 2021-09-16 RX ORDER — BUSPIRONE HYDROCHLORIDE 5 MG/1
5 TABLET ORAL
Qty: 90 TABLET | Refills: 1 | Status: SHIPPED | OUTPATIENT
Start: 2021-09-16 | End: 2022-10-04

## 2021-09-16 NOTE — PROGRESS NOTES
Tess Fitzpatrick is a 80 y.o. female (: 1950) presenting to address:    Chief Complaint   Patient presents with    Results     xray results       Vitals:    21 1426   BP: 132/86   Pulse: 66   Resp: 18   Temp: 97.3 °F (36.3 °C)   TempSrc: Temporal   SpO2: 97%   Weight: 248 lb (112.5 kg)   Height: 5' 1\" (1.549 m)   PainSc:   0 - No pain       Hearing/Vision:   No exam data present    Learning Assessment:     Learning Assessment 2017   PRIMARY LEARNER Patient   HIGHEST LEVEL OF EDUCATION - PRIMARY LEARNER  > 4 YEARS OF COLLEGE   BARRIERS PRIMARY LEARNER NONE   CO-LEARNER CAREGIVER No   PRIMARY LANGUAGE ENGLISH   LEARNER PREFERENCE PRIMARY DEMONSTRATION   ANSWERED BY self   RELATIONSHIP SELF     Depression Screening:     3 most recent PHQ Screens 2021   Little interest or pleasure in doing things Not at all   Feeling down, depressed, irritable, or hopeless Not at all   Total Score PHQ 2 0     Fall Risk Assessment:     Fall Risk Assessment, last 12 mths 2021   Able to walk? Yes   Fall in past 12 months? 0   Do you feel unsteady? 1   Are you worried about falling 1   Is the gait abnormal? 1   Number of falls in past 12 months 0   Fall with injury? -     Abuse Screening:     Abuse Screening Questionnaire 2021   Do you ever feel afraid of your partner? N   Are you in a relationship with someone who physically or mentally threatens you? N   Is it safe for you to go home? Y     Coordination of Care Questionaire:   1. Have you been to the ER, urgent care clinic since your last visit? Hospitalized since your last visit? NO    2. Have you seen or consulted any other health care providers outside of the [address] [place] [address] since your last visit? Include any pap smears or colon screening. NO    Advanced Directive:   1. Do you have an Advanced Directive? YES    2. Would you like information on Advanced Directives? NO    Pt would like to get flu vaccine.     Immunization of the high dose flu vaccine was administered 9/16/2021 by Zeb Rehman LPN. Patient tolerated procedure well. No reactions noted.

## 2021-09-16 NOTE — PROGRESS NOTES
Jerry Gomez     Chief Complaint   Patient presents with    Results     xray results     Vitals:    09/16/21 1426   BP: 132/86   Pulse: 66   Resp: 18   Temp: 97.3 °F (36.3 °C)   TempSrc: Temporal   SpO2: 97%   Weight: 248 lb (112.5 kg)   Height: 5' 1\" (1.549 m)   PainSc:   0 - No pain         HPI:Pateint is here with her son BP is well controled  now     Lost about 20 lbs with lifestyle modifications     Recent CXR 7/21 at urgent care   FINDINGS:     HEART AND MEDIASTINUM: Stable cardiomediastinal silhouette. Mildly enlarged cardiac silhouette and tortuous thoracic aorta. LUNGS AND PLEURAL SPACES: Slightly low lung volumes. No suspicious opacities. No convincing edema. No visible pleural abnormalities. BONY THORAX AND SOFT TISSUES: Unremarkable. CXR done at urgent care 7/21        HEART AND MEDIASTINUM: Stable cardiomediastinal silhouette. Mildly enlarged cardiac silhouette and tortuous thoracic aorta. LUNGS AND PLEURAL SPACES: Slightly low lung volumes. No suspicious opacities. No convincing edema. No visible pleural abnormalities. BONY THORAX AND SOFT TISSUES: Unremarkable. Stress test 2011    Overall Impression: Findings suggesting ischemia involving the anterior and lateral walls as described above. Normal left ventricular function with left ventricular ejection fraction of 53%. Echo is  2011    Past Medical History:   Diagnosis Date    Anxiety     Arthritis     Diabetes ([address]     Hypercholesterolemia     Hypertension      Past Surgical History:   Procedure Laterality Date    HX GYN       Social History     Tobacco Use    Smoking status: Never Smoker    Smokeless tobacco: Never Used   Substance Use Topics    Alcohol use: No       Family History   Problem Relation Age of Onset    Heart Disease Mother     Liver Disease Father     Heart Disease Brother        Review of Systems   Constitutional: Negative for chills, fever, malaise/fatigue and weight loss.    HENT: Negative for congestion, ear discharge, ear pain, hearing loss, nosebleeds, sinus pain and sore throat. Eyes: Negative for blurred vision, double vision and discharge. Respiratory: Negative for cough, hemoptysis, sputum production, shortness of breath and wheezing. Cardiovascular: Negative for chest pain, palpitations, claudication and leg swelling. Gastrointestinal: Negative for abdominal pain, constipation, diarrhea, nausea and vomiting. Genitourinary: Negative for dysuria, frequency and urgency. Musculoskeletal: Positive for joint pain and myalgias. Negative for falls and neck pain. Skin: Negative for itching and rash. Neurological: Negative for dizziness, tingling, sensory change, speech change, focal weakness, weakness and headaches. Psychiatric/Behavioral: Negative for depression and suicidal ideas. Physical Exam  Vitals and nursing note reviewed. Constitutional:       General: She is not in acute distress. Appearance: She is well-developed. She is not diaphoretic. HENT:      Head: Normocephalic and atraumatic. Eyes:      General: No scleral icterus. Right eye: No discharge. Left eye: No discharge. Neck:      Thyroid: No thyromegaly. Cardiovascular:      Rate and Rhythm: Normal rate and regular rhythm. Heart sounds: Normal heart sounds. Pulmonary:      Effort: Pulmonary effort is normal. No respiratory distress. Breath sounds: Normal breath sounds. No wheezing or rales. Chest:      Chest wall: No tenderness. Abdominal:      General: There is no distension. Palpations: Abdomen is soft. Tenderness: There is no abdominal tenderness. There is no rebound. Musculoskeletal:         General: No tenderness or deformity. Normal range of motion. Right lower leg: Edema present. Left lower leg: Edema present. Lymphadenopathy:      Cervical: No cervical adenopathy. Skin:     General: Skin is warm and dry.       Coloration: Skin is not pale. Findings: No erythema or rash. Neurological:      Mental Status: She is alert and oriented to person, place, and time. Cranial Nerves: No cranial nerve deficit. Coordination: Coordination normal.   Psychiatric:         Behavior: Behavior normal.         Thought Content: Thought content normal.         Judgment: Judgment normal.          Assessment and plan     Plan of care has been discussed with the patient, he agrees to the plan and verbalized understanding. All his questions were answered  More than 50% of the time spent in this visit was counseling the patient about  illness and treatment options         1. Needs flu shot    - FLU (FLUAD QUAD INFLUENZA VACCINE,QUAD,ADJUVANTED)    2. Anxiety  buspar is helping her   - busPIRone (BUSPAR) 5 mg tablet; Take 1 Tablet by mouth three (3) times daily (with meals) for 90 days. Dispense: 90 Tablet; Refill: 1    3. Type 2 diabetes with nephropathy (HCC)    - REFERRAL TO CARDIOLOGY    4. Mixed hyperlipidemia      5. Morbid obesity with BMI of 50.0-59.9, adult ([facility] [address]  Lost almost 20 lbs since last visit     6. Essential hypertension  Well controlled   - REFERRAL TO CARDIOLOGY    7. Controlled type 2 diabetes mellitus without complication, without long-term current use of insulin ([facility] [address]      8. Mass of right thigh  MRI scheduled next month     9. Lower leg edema    - REFERRAL TO CARDIOLOGY    10. Abnormal CXR    - REFERRAL TO CARDIOLOGY    Current Outpatient Medications   Medication Sig Dispense Refill    busPIRone (BUSPAR) 5 mg tablet Take 1 Tablet by mouth three (3) times daily (with meals) for 90 days. 90 Tablet 1    metFORMIN (GLUCOPHAGE) 500 mg tablet Take 1 Tablet by mouth two (2) times daily (with meals). 180 Tablet 1    pravastatin (PRAVACHOL) 20 mg tablet Take 1 Tablet by mouth nightly. 90 Tablet 1    ergocalciferol (ERGOCALCIFEROL) 1,250 mcg (50,000 unit) capsule Take 1 Capsule by mouth every seven (7) days.  12 Capsule 1    metoprolol succinate (TOPROL-XL) 100 mg tablet Take 1 Tablet by mouth daily. 90 Tablet 0    acetaminophen (TYLENOL ARTHRITIS PAIN PO) Take 650 mg by mouth every eight (8) hours as needed for Pain.  aspirin 81 mg chewable tablet Take 81 mg by mouth daily.  losartan (COZAAR) 100 mg tablet Take 1 tablet by mouth once daily 90 Tablet 1    fluticasone propionate (Flonase) 50 mcg/actuation nasal spray 2 Sprays by Both Nostrils route daily. Indications: inflammation of the nose due to an allergy 1 Bottle 3    amLODIPine (NORVASC) 5 mg tablet Take 1 Tab by mouth daily. 90 Tab 1    hydroCHLOROthiazide (HYDRODIURIL) 50 mg tablet Take 1 Tab by mouth daily.  90 Tab 1       Patient Active Problem List    Diagnosis Date Noted    Anxiety 03/07/2019    Type 2 diabetes with nephropathy ([facility] [address] 03/06/2018    Obesity, morbid ([facility] [address] 12/11/2017    Mixed hyperlipidemia 06/23/2017    Controlled type 2 diabetes mellitus without complication, without long-term current use of insulin ([facility] [address] 06/19/2017    Essential hypertension 06/19/2017    Vitamin D deficiency 06/19/2017     Results for orders placed or performed in visit on 08/06/21   AMB POC HEMOGLOBIN A1C   Result Value Ref Range    Hemoglobin A1c (POC) 5.8 %     Office Visit on 08/06/2021   Component Date Value Ref Range Status    Hemoglobin A1c (POC) 08/06/2021 5.8  % Preliminary

## 2021-10-06 ENCOUNTER — APPOINTMENT (OUTPATIENT)
Dept: FAMILY MEDICINE CLINIC | Age: 71
End: 2021-10-06

## 2021-10-06 ENCOUNTER — HOSPITAL ENCOUNTER (OUTPATIENT)
Dept: LAB | Age: 71
Discharge: HOME | End: 2021-10-06
Payer: MEDICARE

## 2021-10-06 ENCOUNTER — OFFICE VISIT (OUTPATIENT)
Dept: FAMILY MEDICINE CLINIC | Age: 71
End: 2021-10-06
Payer: MEDICARE

## 2021-10-06 VITALS
TEMPERATURE: 97.4 F | DIASTOLIC BLOOD PRESSURE: 88 MMHG | WEIGHT: 251 LBS | HEART RATE: 66 BPM | BODY MASS INDEX: 47.39 KG/M2 | HEIGHT: 61 IN | RESPIRATION RATE: 18 BRPM | OXYGEN SATURATION: 99 % | SYSTOLIC BLOOD PRESSURE: 140 MMHG

## 2021-10-06 DIAGNOSIS — E55.9 VITAMIN D DEFICIENCY: ICD-10-CM

## 2021-10-06 DIAGNOSIS — F41.9 ANXIETY: ICD-10-CM

## 2021-10-06 DIAGNOSIS — Z12.11 COLON CANCER SCREENING: ICD-10-CM

## 2021-10-06 DIAGNOSIS — Z00.00 MEDICARE ANNUAL WELLNESS VISIT, SUBSEQUENT: Primary | ICD-10-CM

## 2021-10-06 DIAGNOSIS — Z12.31 ENCOUNTER FOR SCREENING MAMMOGRAM FOR BREAST CANCER: ICD-10-CM

## 2021-10-06 DIAGNOSIS — B37.2 INTERTRIGINOUS CANDIDIASIS: ICD-10-CM

## 2021-10-06 DIAGNOSIS — E11.21 TYPE 2 DIABETES WITH NEPHROPATHY (HCC): ICD-10-CM

## 2021-10-06 DIAGNOSIS — E78.2 MIXED HYPERLIPIDEMIA: ICD-10-CM

## 2021-10-06 DIAGNOSIS — Z00.00 MEDICARE ANNUAL WELLNESS VISIT, SUBSEQUENT: ICD-10-CM

## 2021-10-06 DIAGNOSIS — Z78.0 MENOPAUSE: ICD-10-CM

## 2021-10-06 LAB
ALBUMIN SERPL-MCNC: 2.8 G/DL (ref 3.4–5)
ALBUMIN/GLOB SERPL: 0.6 {RATIO} (ref 0.8–1.7)
ALP SERPL-CCNC: 64 U/L (ref 45–117)
ALT SERPL-CCNC: 9 U/L (ref 13–56)
ANION GAP SERPL CALC-SCNC: 3 MMOL/L (ref 3–18)
AST SERPL-CCNC: 9 U/L (ref 10–38)
BASOPHILS # BLD: 0.1 K/UL (ref 0–0.1)
BASOPHILS NFR BLD: 1 % (ref 0–2)
BILIRUB SERPL-MCNC: 0.2 MG/DL (ref 0.2–1)
BUN SERPL-MCNC: 16 MG/DL (ref 7–18)
BUN/CREAT SERPL: 14 (ref 12–20)
CALCIUM SERPL-MCNC: 8.8 MG/DL (ref 8.5–10.1)
CHLORIDE SERPL-SCNC: 106 MMOL/L (ref 100–111)
CHOLEST SERPL-MCNC: 185 MG/DL
CO2 SERPL-SCNC: 30 MMOL/L (ref 21–32)
CREAT SERPL-MCNC: 1.13 MG/DL (ref 0.6–1.3)
CREAT UR-MCNC: 134 MG/DL (ref 30–125)
DIFFERENTIAL METHOD BLD: ABNORMAL
EOSINOPHIL # BLD: 0.4 K/UL (ref 0–0.4)
EOSINOPHIL NFR BLD: 5 % (ref 0–5)
ERYTHROCYTE [DISTWIDTH] IN BLOOD BY AUTOMATED COUNT: 16.6 % (ref 11.6–14.5)
EST. AVERAGE GLUCOSE BLD GHB EST-MCNC: 120 MG/DL
GLOBULIN SER CALC-MCNC: 4.6 G/DL (ref 2–4)
GLUCOSE SERPL-MCNC: 100 MG/DL (ref 74–99)
HBA1C MFR BLD: 5.8 % (ref 4.2–5.6)
HCT VFR BLD AUTO: 41.5 % (ref 35–45)
HDLC SERPL-MCNC: 53 MG/DL (ref 40–60)
HDLC SERPL: 3.5 {RATIO} (ref 0–5)
HGB BLD-MCNC: 12.5 G/DL (ref 12–16)
LDLC SERPL CALC-MCNC: 104.4 MG/DL (ref 0–100)
LIPID PROFILE,FLP: ABNORMAL
LYMPHOCYTES # BLD: 2 K/UL (ref 0.9–3.6)
LYMPHOCYTES NFR BLD: 27 % (ref 21–52)
MCH RBC QN AUTO: 25 PG (ref 24–34)
MCHC RBC AUTO-ENTMCNC: 30.1 G/DL (ref 31–37)
MCV RBC AUTO: 83 FL (ref 78–100)
MICROALBUMIN UR-MCNC: 5.58 MG/DL (ref 0–3)
MICROALBUMIN/CREAT UR-RTO: 42 MG/G (ref 0–30)
MONOCYTES # BLD: 0.4 K/UL (ref 0.05–1.2)
MONOCYTES NFR BLD: 6 % (ref 3–10)
NEUTS SEG # BLD: 4.5 K/UL (ref 1.8–8)
NEUTS SEG NFR BLD: 61 % (ref 40–73)
PLATELET # BLD AUTO: 310 K/UL (ref 135–420)
PMV BLD AUTO: 10.3 FL (ref 9.2–11.8)
POTASSIUM SERPL-SCNC: 4.1 MMOL/L (ref 3.5–5.5)
PROT SERPL-MCNC: 7.4 G/DL (ref 6.4–8.2)
RBC # BLD AUTO: 5 M/UL (ref 4.2–5.3)
SODIUM SERPL-SCNC: 139 MMOL/L (ref 136–145)
TRIGL SERPL-MCNC: 138 MG/DL (ref ?–150)
VLDLC SERPL CALC-MCNC: 27.6 MG/DL
WBC # BLD AUTO: 7.4 K/UL (ref 4.6–13.2)

## 2021-10-06 PROCEDURE — 3017F COLORECTAL CA SCREEN DOC REV: CPT | Performed by: LEGAL MEDICINE

## 2021-10-06 PROCEDURE — 80061 LIPID PANEL: CPT

## 2021-10-06 PROCEDURE — G0439 PPPS, SUBSEQ VISIT: HCPCS | Performed by: LEGAL MEDICINE

## 2021-10-06 PROCEDURE — 80053 COMPREHEN METABOLIC PANEL: CPT

## 2021-10-06 PROCEDURE — G8754 DIAS BP LESS 90: HCPCS | Performed by: LEGAL MEDICINE

## 2021-10-06 PROCEDURE — G8536 NO DOC ELDER MAL SCRN: HCPCS | Performed by: LEGAL MEDICINE

## 2021-10-06 PROCEDURE — G8432 DEP SCR NOT DOC, RNG: HCPCS | Performed by: LEGAL MEDICINE

## 2021-10-06 PROCEDURE — 3044F HG A1C LEVEL LT 7.0%: CPT | Performed by: LEGAL MEDICINE

## 2021-10-06 PROCEDURE — G9899 SCRN MAM PERF RSLTS DOC: HCPCS | Performed by: LEGAL MEDICINE

## 2021-10-06 PROCEDURE — G8427 DOCREV CUR MEDS BY ELIG CLIN: HCPCS | Performed by: LEGAL MEDICINE

## 2021-10-06 PROCEDURE — G8400 PT W/DXA NO RESULTS DOC: HCPCS | Performed by: LEGAL MEDICINE

## 2021-10-06 PROCEDURE — 83036 HEMOGLOBIN GLYCOSYLATED A1C: CPT

## 2021-10-06 PROCEDURE — 36415 COLL VENOUS BLD VENIPUNCTURE: CPT

## 2021-10-06 PROCEDURE — G8417 CALC BMI ABV UP PARAM F/U: HCPCS | Performed by: LEGAL MEDICINE

## 2021-10-06 PROCEDURE — 1101F PT FALLS ASSESS-DOCD LE1/YR: CPT | Performed by: LEGAL MEDICINE

## 2021-10-06 PROCEDURE — 85025 COMPLETE CBC W/AUTO DIFF WBC: CPT

## 2021-10-06 PROCEDURE — G8753 SYS BP > OR = 140: HCPCS | Performed by: LEGAL MEDICINE

## 2021-10-06 PROCEDURE — 2022F DILAT RTA XM EVC RTNOPTHY: CPT | Performed by: LEGAL MEDICINE

## 2021-10-06 PROCEDURE — 82043 UR ALBUMIN QUANTITATIVE: CPT

## 2021-10-06 RX ORDER — BLOOD-GLUCOSE METER
EACH MISCELLANEOUS
Qty: 1 EACH | Refills: 0 | Status: SHIPPED | OUTPATIENT
Start: 2021-10-06

## 2021-10-06 RX ORDER — CLOTRIMAZOLE AND BETAMETHASONE DIPROPIONATE 10; .64 MG/G; MG/G
CREAM TOPICAL
Qty: 45 G | Refills: 1 | Status: SHIPPED | OUTPATIENT
Start: 2021-10-06

## 2021-10-06 RX ORDER — BLOOD SUGAR DIAGNOSTIC
STRIP MISCELLANEOUS
Qty: 200 STRIP | Refills: 5 | Status: SHIPPED | OUTPATIENT
Start: 2021-10-06

## 2021-10-06 NOTE — PROGRESS NOTES
Mainor Burr presents today for   Chief Complaint   Patient presents with   Brittany Cornelius Annual Wellness Visit    Request For New Medication     one touch verio reflect diabetes equipment      Visit Vitals  BP (!) 140/88 (BP 1 Location: Left upper arm, BP Patient Position: Sitting, BP Cuff Size: Large adult)   Pulse 66   Temp 97.4 °F (36.3 °C) (Temporal)   Resp 18   Ht 5' 1\" (1.549 m)   Wt 251 lb (113.9 kg)   SpO2 99%   BMI 47.43 kg/m²       Is someone accompanying this pt? Yes. Is the patient using any DME equipment during OV? Yes    Depression Screening:  3 most recent PHQ Screens 10/6/2021   Little interest or pleasure in doing things Not at all   Feeling down, depressed, irritable, or hopeless Not at all   Total Score PHQ 2 0       Learning Assessment:  Learning Assessment 6/19/2017   PRIMARY LEARNER Patient   HIGHEST LEVEL OF EDUCATION - PRIMARY LEARNER  > 4 YEARS OF COLLEGE   BARRIERS PRIMARY LEARNER NONE   CO-LEARNER CAREGIVER No   PRIMARY LANGUAGE ENGLISH   LEARNER PREFERENCE PRIMARY DEMONSTRATION   ANSWERED BY self   RELATIONSHIP SELF       Travel Screening:    Travel Screening     Question   Response    In the last month, have you been in contact with someone who was confirmed or suspected to have [address] / COVID-19? No / Unsure    Have you had a COVID-19 viral test in the last 14 days? No    Do you have any of the following new or worsening symptoms? None of these    Have you traveled internationally or domestically in the last month? No      Travel History   Travel since 09/06/21     No documented travel since 09/06/21          Health Maintenance reviewed and discussed and ordered per Provider.     Health Maintenance Due   Topic Date Due    Colorectal Cancer Screening Combo  Never done    Shingrix Vaccine Age 50> (1 of 2) Never done    Breast Cancer Screen Mammogram  Never done    Bone Densitometry (Dexa) Screening  Never done    Foot Exam Q1  03/07/2020    A1C test (Diabetic or Prediabetic) 08/27/2021    MICROALBUMIN Q1  08/27/2021    Lipid Screen  08/27/2021   . Coordination of Care:  1. Have you been to the ER, urgent care clinic since your last visit? Hospitalized since your last visit? no    2. Have you seen or consulted any other health care providers outside of the [address] [place] [address] since your last visit? Include any pap smears or colon screening.  no

## 2021-10-06 NOTE — PROGRESS NOTES
(AWV) The Initial Medicare Annual Wellness Exam PROGRESS NOTE    This is an Initial Medicare Annual Wellness Exam (AWV) (Performed 12 months after IPPE or effective date of Medicare Part B enrollment, Once in a lifetime)    I have reviewed the patient's medical history in detail and updated the computerized patient record. Kwabena Langford is a 80 y.o.  female and presents for an annual wellness exam   She is accompanied by her son as usual   She has been adherent to medications    And trying to be adherent to lifestyle modification as well    She still waiting for MRI to be done tomorrow of her right thigh      Patient Active Problem List   Diagnosis Code    Controlled type 2 diabetes mellitus without complication, without long-term current use of insulin ([address] E11.9    Essential hypertension I10    Vitamin D deficiency E55.9    Mixed hyperlipidemia E78.2    Obesity, morbid ([address] E66.01    Type 2 diabetes with nephropathy ([address] E11.21    Anxiety F41.9     Patient Active Problem List    Diagnosis Date Noted    Anxiety 03/07/2019    Type 2 diabetes with nephropathy ([address] 03/06/2018    Obesity, morbid ([address] 12/11/2017    Mixed hyperlipidemia 06/23/2017    Controlled type 2 diabetes mellitus without complication, without long-term current use of insulin ([address] 06/19/2017    Essential hypertension 06/19/2017    Vitamin D deficiency 06/19/2017     Current Outpatient Medications   Medication Sig Dispense Refill    clotrimazole-betamethasone (LOTRISONE) topical cream Use daily for 10 days 45 g 1    busPIRone (BUSPAR) 5 mg tablet Take 1 Tablet by mouth three (3) times daily (with meals) for 90 days. 90 Tablet 1    metFORMIN (GLUCOPHAGE) 500 mg tablet Take 1 Tablet by mouth two (2) times daily (with meals). 180 Tablet 1    pravastatin (PRAVACHOL) 20 mg tablet Take 1 Tablet by mouth nightly.  90 Tablet 1    ergocalciferol (ERGOCALCIFEROL) 1,250 mcg (50,000 unit) capsule Take 1 Capsule by mouth every seven (7) days. 12 Capsule 1    metoprolol succinate (TOPROL-XL) 100 mg tablet Take 1 Tablet by mouth daily. 90 Tablet 0    acetaminophen (TYLENOL ARTHRITIS PAIN PO) Take 650 mg by mouth every eight (8) hours as needed for Pain.  aspirin 81 mg chewable tablet Take 81 mg by mouth daily.  losartan (COZAAR) 100 mg tablet Take 1 tablet by mouth once daily 90 Tablet 1    fluticasone propionate (Flonase) 50 mcg/actuation nasal spray 2 Sprays by Both Nostrils route daily.  Indications: inflammation of the nose due to an allergy 1 Bottle 3     Allergies   Allergen Reactions    Penicillins Itching     Past Medical History:   Diagnosis Date    Anxiety     Arthritis     Diabetes ([address]     Hypercholesterolemia     Hypertension      Past Surgical History:   Procedure Laterality Date    HX GYN       Family History   Problem Relation Age of Onset    Heart Disease Mother     Liver Disease Father     Heart Disease Brother      Social History     Tobacco Use    Smoking status: Never Smoker    Smokeless tobacco: Never Used   Substance Use Topics    Alcohol use: No       ROS   History obtained from the patient   General ROS: negative for - chills, fatigue or fever  Psychological ROS: negative for - anxiety or depression  Ophthalmic ROS: negative for - blurry vision, decreased vision or double vision  ENT ROS: negative for - epistaxis or headaches    Hematological and Lymphatic ROS: negative for - bleeding problems, blood clots or blood transfusions  Endocrine ROS: negative for - breast changes or galactorrhea  Breast ROS: negative for breast lumps  Respiratory ROS: no cough, shortness of breath, or wheezing  Cardiovascular ROS: no chest pain or dyspnea on exertion  Gastrointestinal ROS: no abdominal pain, change in bowel habits, or black or bloody stools  Genito-Urinary ROS: no dysuria, trouble voiding, or hematuria  Musculoskeletal ROS: negative  Neurological ROS: no TIA or stroke symptoms  Dermatological ROS: positive for - dry skin also she had a skin rash under her abdomen and breast         History     Past Medical History:   Diagnosis Date    Anxiety     Arthritis     Diabetes ([address]     Hypercholesterolemia     Hypertension       Past Surgical History:   Procedure Laterality Date    HX GYN       Current Outpatient Medications   Medication Sig Dispense Refill    clotrimazole-betamethasone (LOTRISONE) topical cream Use daily for 10 days 45 g 1    busPIRone (BUSPAR) 5 mg tablet Take 1 Tablet by mouth three (3) times daily (with meals) for 90 days. 90 Tablet 1    metFORMIN (GLUCOPHAGE) 500 mg tablet Take 1 Tablet by mouth two (2) times daily (with meals). 180 Tablet 1    pravastatin (PRAVACHOL) 20 mg tablet Take 1 Tablet by mouth nightly. 90 Tablet 1    ergocalciferol (ERGOCALCIFEROL) 1,250 mcg (50,000 unit) capsule Take 1 Capsule by mouth every seven (7) days. 12 Capsule 1    metoprolol succinate (TOPROL-XL) 100 mg tablet Take 1 Tablet by mouth daily. 90 Tablet 0    acetaminophen (TYLENOL ARTHRITIS PAIN PO) Take 650 mg by mouth every eight (8) hours as needed for Pain.  aspirin 81 mg chewable tablet Take 81 mg by mouth daily.  losartan (COZAAR) 100 mg tablet Take 1 tablet by mouth once daily 90 Tablet 1    fluticasone propionate (Flonase) 50 mcg/actuation nasal spray 2 Sprays by Both Nostrils route daily.  Indications: inflammation of the nose due to an allergy 1 Bottle 3     Allergies   Allergen Reactions    Penicillins Itching     Family History   Problem Relation Age of Onset    Heart Disease Mother     Liver Disease Father     Heart Disease Brother      Social History     Tobacco Use    Smoking status: Never Smoker    Smokeless tobacco: Never Used   Substance Use Topics    Alcohol use: No     Patient Active Problem List   Diagnosis Code    Controlled type 2 diabetes mellitus without complication, without long-term current use of insulin ([address] E11.9    Essential hypertension I10    Vitamin D deficiency E55.9    Mixed hyperlipidemia E78.2    Obesity, morbid ([address] E66.01    Type 2 diabetes with nephropathy (HCC) E11.21    Anxiety F41.9       Health Maintenance History  Immunizations reviewed, she is due for the shingles shot she can take it at the pharmacy    colonoscopy: She has Cologuard ordered times in the past but this has not been completed it would be ordered again today  Chest CT not applicable she is not a smoker  Eye exam:She will schedule follow-up with ophthalmologist  Diane Wagner has been ordered today  Dexascan*een ordered today        Depression Risk Factor Screening:      Patient Health Questionnaire (PHQ-2)   Over the last 2 weeks, how often have you been bothered by any of the following problems? · Little interest or pleasure in doing things? · Not at all. [0]  · Feeling down, depressed, or hopeless? · Not at all. [0]    Total Score: 0/6  PHQ-2 Assessment Scoring:   A score of 2 or more requires further screening with the PHQ-9    Alcohol Risk Factor Screening:     Women: On any occasion during the past 3 months, have you had more than 3 drinks containing alcohol? No    Do you average more than 7 drinks per week? No  Men: On any occasion during the past 3 months, have you had more than 4 drinks containing alcohol? Do you average more than 14 drinks per week? Functional Ability and Level of Safety:     Hearing Loss    Hearing is good. Activities of Daily Living   Partial assistance.    Requires assistance with: ambulation, bathing and hygiene, grooming and no ADLs    Fall Risk   Ambulatory aid device (15 pts)  Gait weak (10 pts)    Abuse Screen   Patient is not abused  None    Examination   Physical Examination  Vitals:    10/06/21 0910 10/06/21 0916 10/06/21 1010   BP: (!) 170/96 (!) 160/92 (!) 140/88   Pulse: 66     Resp: 18     Temp: 97.4 °F (36.3 °C)     TempSrc: Temporal     SpO2: 99%     Weight: 251 lb (113.9 kg)     Height: 5' 1\" (1.549 m)     PainSc:   0 - No pain        Body mass index is 47.43 kg/m². Evaluation of Cognitive Function:  Mood/affect:good   Appearance:dry skin hair is not done   Family member/caregiver input: Her son was helpful with history  alert, well appearing, and in no distress    Patient has rash under her stomach and under both breasts erythematous suggestive for candidal intertrigo    Patient Care Team:  Amish Lantigua MD as PCP - General (Internal Medicine)  Amish Lantigua MD as PCP - [facility] Empaneled Provider  Cindy Motta MD as Consulting Provider (Optometry)    End-of-life planning  Advanced Directive in the case than an injury or illness causes the patient to be unable to make health care decisions    Health Care Directive or Living Will: no    Advice/Referrals/Counselling/Plan:   Education and counseling provided:  Are appropriate based on today's review and evaluation  Pneumococcal Vaccine  Influenza Vaccine  Screening Mammography  Colorectal cancer screening tests  Bone mass measurement (DEXA)  Screening for glaucoma  Include in education list (weight loss, physical activity, smoking cessation, fall prevention, and nutrition)    ICD-10-CM ICD-9-CM    1. Medicare annual wellness visit, subsequent  Z00.00 V70.0 CBC WITH AUTOMATED DIFF   2. Encounter for screening mammogram for breast cancer  Z12.31 V76.12 [facility] MAMMO BI SCREENING INCL CAD   3. Menopause  Z78.0 627.2 DEXA BONE DENSITY STUDY AXIAL   4. Type 2 diabetes with nephropathy (HCC)  E11.21 250.40 HEMOGLOBIN A1C W/O EAG     583.81 LIPID PANEL      METABOLIC PANEL, COMPREHENSIVE      MICROALBUMIN, UR, RAND W/ MICROALB/CREAT RATIO      CBC WITH AUTOMATED DIFF   5. Mixed hyperlipidemia  E78.2 272.2 LIPID PANEL      METABOLIC PANEL, COMPREHENSIVE      CBC WITH AUTOMATED DIFF   6. Vitamin D deficiency  E55.9 268.9    7. Colon cancer screening  Z12.11 V76.51 COLOGUARD TEST (FECAL DNA COLORECTAL CANCER SCREENING)   8.  Anxiety  F41.9 300.00 CBC WITH AUTOMATED DIFF   9. Intertriginous candidiasis  B37.2 112.3 clotrimazole-betamethasone (LOTRISONE) topical cream     Encounter Diagnoses   Name Primary?     Medicare annual wellness visit, subsequent Yes    Encounter for screening mammogram for breast cancer     Menopause     Type 2 diabetes with nephropathy [facility]     Mixed hyperlipidemia     Vitamin D deficiency     Colon cancer screening     Anxiety     Intertriginous candidiasis      Orders Placed This Encounter    [facility] MAMMO BI SCREENING INCL CAD    DEXA BONE DENSITY STUDY AXIAL    HEMOGLOBIN A1C W/O EAG    LIPID PANEL    METABOLIC PANEL, COMPREHENSIVE    MICROALBUMIN, UR, RAND W/ MICROALB/CREAT RATIO    COLOGUARD TEST (FECAL DNA COLORECTAL CANCER SCREENING)    CBC WITH AUTOMATED DIFF    clotrimazole-betamethasone (LOTRISONE) topical cream     Orders Placed This Encounter    [facility] MAMMO BI SCREENING INCL CAD     Standing Status:   Future     Standing Expiration Date:   11/6/2022     Scheduling Instructions:      [facility]    DEXA BONE DENSITY STUDY AXIAL     Standing Status:   Future     Standing Expiration Date:   11/6/2022     Scheduling Instructions:      [facility]    HEMOGLOBIN A1C W/O EAG     Standing Status:   Future     Number of Occurrences:   1     Standing Expiration Date:   10/7/2022    LIPID PANEL     Standing Status:   Future     Number of Occurrences:   1     Standing Expiration Date:   15/2/2398    METABOLIC PANEL, COMPREHENSIVE     Standing Status:   Future     Number of Occurrences:   1     Standing Expiration Date:   10/7/2022    MICROALBUMIN, UR, RAND W/ MICROALB/CREAT RATIO     Standing Status:   Future     Number of Occurrences:   1     Standing Expiration Date:   10/7/2022    COLOGUARD TEST (FECAL DNA COLORECTAL CANCER SCREENING)    CBC WITH AUTOMATED DIFF     Standing Status:   Future     Number of Occurrences:   1     Standing Expiration Date:   10/7/2022    clotrimazole-betamethasone (LOTRISONE) topical cream     Sig: Use daily for 10 days     Dispense:  45 g     Refill:  1     Orders Placed This Encounter    [facility] MAMMO BI SCREENING INCL CAD    DEXA BONE DENSITY STUDY AXIAL    HEMOGLOBIN A1C W/O EAG    LIPID PANEL    METABOLIC PANEL, COMPREHENSIVE    MICROALBUMIN, UR, RAND W/ MICROALB/CREAT RATIO    COLOGUARD TEST (FECAL DNA COLORECTAL CANCER SCREENING)    CBC WITH AUTOMATED DIFF    clotrimazole-betamethasone (LOTRISONE) topical cream     Diagnoses and all orders for this visit:    1. Medicare annual wellness visit, subsequent  -     CBC WITH AUTOMATED DIFF; Future    2. Encounter for screening mammogram for breast cancer  -     [facility] MAMMO BI SCREENING INCL CAD; Future    3. Menopause  -     DEXA BONE DENSITY STUDY AXIAL; Future    4. Type 2 diabetes with nephropathy (HCC)  -     HEMOGLOBIN A1C W/O EAG; Future  -     LIPID PANEL; Future  -     METABOLIC PANEL, COMPREHENSIVE; Future  -     MICROALBUMIN, UR, RAND W/ MICROALB/CREAT RATIO; Future  -     CBC WITH AUTOMATED DIFF; Future    5. Mixed hyperlipidemia  -     LIPID PANEL; Future  -     METABOLIC PANEL, COMPREHENSIVE; Future  -     CBC WITH AUTOMATED DIFF; Future    6. Vitamin D deficiency    7. Colon cancer screening  -     COLOGUARD TEST (FECAL DNA COLORECTAL CANCER SCREENING)    8. Anxiety  -     CBC WITH AUTOMATED DIFF; Future    9. Intertriginous candidiasis    I have discussed with patient to keep the area dry and clean she can use nonscented powder to keep the area dry  Exposed area while at home  -     clotrimazole-betamethasone (LOTRISONE) topical cream; Use daily for 10 days      Follow-up and Dispositions    · Return in about 3 months (around 1/6/2022). current treatment plan is effective, no change in therapy. Brief written plan, checklist    I have discussed the diagnosis with the patient and the intended plan as seen in the above orders. The patient has received an after-visit summary and questions were answered concerning future plans.   I have discussed medication side effects and warnings with the patient as well. I have reviewed the plan of care with the patient, accepted their input and they are in agreement with the treatment goals. Follow-up and Dispositions    · Return in about 3 months (around 1/6/2022).            ____________________________________________________________    Problem Assessment    for treatment of   Chief Complaint   Patient presents with    Annual Wellness Visit    Request For New Medication     one touch verio reflect diabetes equipment

## 2021-10-13 ENCOUNTER — TELEPHONE (OUTPATIENT)
Dept: FAMILY MEDICINE CLINIC | Age: 71
End: 2021-10-13

## 2021-10-13 DIAGNOSIS — R22.41 MASS OF RIGHT THIGH: Primary | ICD-10-CM

## 2021-10-13 NOTE — TELEPHONE ENCOUNTER
MRI results is concerning the mass has increased in size since ultrasound was done, recommendation for biopsy she will be referred to orthopedic oncologist

## 2021-10-14 ENCOUNTER — TELEPHONE (OUTPATIENT)
Dept: FAMILY MEDICINE CLINIC | Age: 71
End: 2021-10-14

## 2021-10-14 NOTE — TELEPHONE ENCOUNTER
Informed pt of MRI results, and treatment plan, pt stated that she will have her son call, to explain everything to him.

## 2021-12-09 ENCOUNTER — TELEPHONE (OUTPATIENT)
Dept: FAMILY MEDICINE CLINIC | Age: 71
End: 2021-12-09

## 2021-12-10 NOTE — TELEPHONE ENCOUNTER
And can you please find out if the patient ever seen Dr. Aruna Tomlin for her right thigh mass if he is please get office note

## 2021-12-13 NOTE — TELEPHONE ENCOUNTER
Zander w/Kelsey from Dr. Shavon Jordan office and they will fax the office notes to our office; received notes via fax.

## 2022-01-05 ENCOUNTER — TELEPHONE (OUTPATIENT)
Dept: MAMMOGRAPHY | Age: 72
End: 2022-01-05

## 2022-01-19 DIAGNOSIS — R22.41 MASS OF RIGHT THIGH: ICD-10-CM

## 2022-01-20 ENCOUNTER — TELEPHONE (OUTPATIENT)
Dept: FAMILY MEDICINE CLINIC | Age: 72
End: 2022-01-20

## 2022-01-20 NOTE — TELEPHONE ENCOUNTER
[facility]; pt has not completed the Cologuard test ordered for them.

## 2022-04-27 ENCOUNTER — TELEPHONE (OUTPATIENT)
Dept: MAMMOGRAPHY | Age: 72
End: 2022-04-27

## 2022-04-27 NOTE — TELEPHONE ENCOUNTER
I called  Parveen King, to assist her  in scheduling a Mammogram . I left a message for this patient to return my call  at [phone].     Karol Herrmann LPN   Panel Manager

## 2022-06-30 ENCOUNTER — PATIENT OUTREACH (OUTPATIENT)
Dept: CASE MANAGEMENT | Age: 72
End: 2022-06-30

## 2022-06-30 NOTE — PROGRESS NOTES
.Date/Time:  6/30/2022 4:32 PM    Method of communication with patient:phone    [facility] ( [facility]) made out reach to  patient by telephone to offer Complex Case Management  ( CCM). Provided introduction to self, and explanation of the Ambulatory Care . Contact at [phone] anytime Monday thru Friday 08:00-4:30.

## 2022-07-07 ENCOUNTER — PATIENT OUTREACH (OUTPATIENT)
Dept: CASE MANAGEMENT | Age: 72
End: 2022-07-07

## 2022-07-07 NOTE — PROGRESS NOTES
.Date/Time:  7/7/2022 3:14 PM    Method of communication with patient:phone    [facility] ( [facility]) made second out reach to  patient by telephone to offer Complex Case Management  ( CCM). Provided introduction to self, and explanation of the Ambulatory Care . Contact at [phone] anytime Monday thru Friday 08:00-4:30.

## 2022-07-20 ENCOUNTER — PATIENT OUTREACH (OUTPATIENT)
Dept: CASE MANAGEMENT | Age: 72
End: 2022-07-20

## 2022-07-20 NOTE — PROGRESS NOTES
.Complex Case Management Deferred 90 days      Date/Time:  7/20/2022 11:16 AM    Method of communication with patient:phone    [facility] ( [facility]) attempted contact the patient by telephone to perform Ambulatory Care Coordination. For three unsuccessful calls. Care Coordination Services calls will be suspended for 90 days.

## 2022-08-17 ENCOUNTER — TELEPHONE (OUTPATIENT)
Dept: FAMILY MEDICINE CLINIC | Age: 72
End: 2022-08-17

## 2022-08-17 NOTE — TELEPHONE ENCOUNTER
Received prior auth approval for [employer] effective: 1/1/22 through 12/31/22. Will be scanned into patient chart and pharmacy will be notified.

## 2022-08-22 ENCOUNTER — PATIENT OUTREACH (OUTPATIENT)
Dept: CASE MANAGEMENT | Age: 72
End: 2022-08-22

## 2022-08-22 NOTE — PROGRESS NOTES
Care Transitions Initial Call    Call within 2 business days of discharge: Yes     Patient: Jyoti Milder Patient : 1950 MRN: [id number]    Last Discharge [address]       None            Was this an external facility discharge? 2022 to 2022 Discharge Facility: [facility]    CTN reviewed patient chart. CTN then attempted to call patient for hospital follow up following a stay at [facility] for hypertensive urgency from 2022 to 2022. Attempted to call patient and ist number on chart was not in service. Could not leave a message. CTN then attempted second number on chart and mailbox was full so unable to leave a message. Will attempt to call at a later time. Will follow.

## 2022-08-23 ENCOUNTER — PATIENT OUTREACH (OUTPATIENT)
Dept: CASE MANAGEMENT | Age: 72
End: 2022-08-23

## 2022-08-30 ENCOUNTER — PATIENT OUTREACH (OUTPATIENT)
Dept: CASE MANAGEMENT | Age: 72
End: 2022-08-30

## 2022-08-30 NOTE — PROGRESS NOTES
Care Transitions Initial Call    Call within 2 business days of discharge: Yes     Patient: Anna Pisano Patient : 1950 MRN: [id number]    Last Discharge [address]       None            Was this an external facility discharge? 2022 to 2022 Discharge Facility: [facility]    CTN reviewed patient chart. CTN then attempted to call patient for hospital follow up following a stay at [facility] for hypertensive urgency from 2022 to 2022. Attempted to call patient and 1st number on chart was not in service. Could not leave a message. CTN then attempted second number on chart and left a message for patient to return my call at her earliest convenience. Return contact information left in message. This is the 3rd attempt to contact patient without success. Will resolve episode.

## 2022-10-01 DIAGNOSIS — F41.9 ANXIETY: ICD-10-CM

## 2022-10-03 ENCOUNTER — TELEPHONE (OUTPATIENT)
Dept: MAMMOGRAPHY | Age: 72
End: 2022-10-03

## 2022-10-04 RX ORDER — BUSPIRONE HYDROCHLORIDE 5 MG/1
5 TABLET ORAL
Qty: 90 TABLET | Refills: 0 | Status: SHIPPED | OUTPATIENT
Start: 2022-10-04 | End: 2023-01-02

## 2022-10-10 NOTE — TELEPHONE ENCOUNTER
Pt's son stated that he will give the office a call back later to have the pt be scheduled for [address]

## 2023-10-02 NOTE — Clinical Note
From: Karen RANDOLPH Till  To: Spring Beckford  Sent: 9/29/2023 9:36 PM CDT  Subject: Flu shot    Hi Dr. SHEPARD  Thanks for updates. Will plan to get re-check in 6 mos.  Can you please put letter in my chart verifying I got my flu shot? I need it for work.  Thank you,  Chrissy   Therapy Functional Score Assessment  Question   Score   Grooming  1       Upper Dressing 1      Lower Dressing 1      Bathing  5      Toilet Transfer  1    Transfer  1            Ambulation  3   Dyspnea                     2       Pain Interfering with activity 0  Est number therapy visits      4

## 2025-01-28 NOTE — HOME HEALTH
S: Pt c/o slight headache. It increases slightly but goes away after eating applesauce. When discussing sugar levels, pt's son reports that it's been a couple of days since the last time they checked her sugar. Pt's son reports that he catches his mother amb without her rollator because she doesn't like it. Pain:  Pt is unable to quantify the pain of her headache  Medication: Reviewed   Future MD appointments: 8/27/21 MRI for RLE  O:  HEP x 10  Seated: 1. Breathing through pursed lips , 2. Toe taps, 2. LAQ , 4. BKWD low arm circles, 6. Samuel Retro Shoulder Rolls, 8. Bicep curls  Standing: lower handles on rollator;  3. Heel raises , 7. Mini Squats, 9. Hip ABD   Pt/Caregiver education: Pt issued and instructed in COPD/CHF exercises protocol  Pt instructed in HEP above and instructed to perform 2x's/day  A: Patient was able to participate fully with PT session. Progressing well toward goals for improved strength and endurance. Pt continues to have deficits in health maintenance, strength, and gait training. Patient will benefit from continued intervention with progression of education with health maintenance, strength, endurance and gait training. MEDICATIONS: There are no changes in medications. Medications appear to be effective at this time. Pt/CAREGIVER EDUCATION:     Pt verbalizes a good understanding of pt education; including HEP, health maintenamce, POC and D/C planning. Equipment needs:  Other services:   P: Pt frequencies: 3 w 1, 2 w 3. Pt has 1 remaining visit this week. There is a continued need for skilled PT to address strength, health maintenance endurance and gait training. Practice amb with quad cane to determine if pt likes it more. Bring Diabetic Teaching packet. Discuss with PT the possibility of Telehealth. To clarify, she is okay with me ordering the thyroid US or wants to discuss with PCP? I am fine with either